# Patient Record
Sex: MALE | Race: WHITE | Employment: FULL TIME | URBAN - METROPOLITAN AREA
[De-identification: names, ages, dates, MRNs, and addresses within clinical notes are randomized per-mention and may not be internally consistent; named-entity substitution may affect disease eponyms.]

---

## 2020-01-09 ENCOUNTER — HOSPITAL ENCOUNTER (OUTPATIENT)
Dept: NON INVASIVE DIAGNOSTICS | Age: 60
Discharge: HOME | End: 2020-01-09
Payer: COMMERCIAL

## 2020-01-09 ENCOUNTER — HOSPITAL ENCOUNTER (OUTPATIENT)
Dept: NUCLEAR MEDICINE | Age: 60
Discharge: HOME | End: 2020-01-09
Payer: COMMERCIAL

## 2020-01-09 VITALS — WEIGHT: 170 LBS | BODY MASS INDEX: 26.68 KG/M2 | HEIGHT: 67 IN

## 2020-01-09 LAB
LV EF: 57 %
LVEF MODALITY: NORMAL

## 2020-01-09 PROCEDURE — 6360000002 HC RX W HCPCS: Performed by: GENERAL PRACTICE

## 2020-01-09 PROCEDURE — 93017 CV STRESS TEST TRACING ONLY: CPT

## 2020-01-09 PROCEDURE — A9500 TC99M SESTAMIBI: HCPCS | Performed by: RADIOLOGY

## 2020-01-09 PROCEDURE — 3430000000 HC RX DIAGNOSTIC RADIOPHARMACEUTICAL: Performed by: RADIOLOGY

## 2020-01-09 PROCEDURE — 93018 CV STRESS TEST I&R ONLY: CPT | Performed by: INTERNAL MEDICINE

## 2020-01-09 PROCEDURE — 93016 CV STRESS TEST SUPVJ ONLY: CPT | Performed by: INTERNAL MEDICINE

## 2020-01-09 PROCEDURE — 78452 HT MUSCLE IMAGE SPECT MULT: CPT

## 2020-01-09 RX ADMIN — Medication 30 MILLICURIE: at 09:59

## 2020-01-09 RX ADMIN — REGADENOSON 0.4 MG: 0.08 INJECTION, SOLUTION INTRAVENOUS at 10:40

## 2020-01-09 RX ADMIN — Medication 10 MILLICURIE: at 09:59

## 2021-09-08 ENCOUNTER — TELEPHONE (OUTPATIENT)
Dept: VASCULAR SURGERY | Age: 61
End: 2021-09-08

## 2021-09-09 ENCOUNTER — OFFICE VISIT (OUTPATIENT)
Dept: VASCULAR SURGERY | Age: 61
End: 2021-09-09
Payer: COMMERCIAL

## 2021-09-09 VITALS — BODY MASS INDEX: 25.9 KG/M2 | WEIGHT: 165 LBS | HEIGHT: 67 IN

## 2021-09-09 DIAGNOSIS — I73.9 PVD (PERIPHERAL VASCULAR DISEASE) WITH CLAUDICATION (HCC): ICD-10-CM

## 2021-09-09 DIAGNOSIS — R09.89 BILATERAL CAROTID BRUITS: ICD-10-CM

## 2021-09-09 DIAGNOSIS — I65.23 BILATERAL CAROTID ARTERY STENOSIS: ICD-10-CM

## 2021-09-09 DIAGNOSIS — F17.200 TOBACCO DEPENDENCE: ICD-10-CM

## 2021-09-09 PROCEDURE — 99204 OFFICE O/P NEW MOD 45 MIN: CPT | Performed by: SURGERY

## 2021-09-09 RX ORDER — METOPROLOL SUCCINATE 25 MG/1
25 TABLET, EXTENDED RELEASE ORAL DAILY
COMMUNITY
End: 2022-03-10 | Stop reason: CLARIF

## 2021-09-09 RX ORDER — CILOSTAZOL 100 MG/1
100 TABLET ORAL 2 TIMES DAILY
Qty: 60 TABLET | Refills: 11 | Status: SHIPPED | OUTPATIENT
Start: 2021-09-09 | End: 2022-03-10

## 2021-09-09 RX ORDER — ROSUVASTATIN CALCIUM 10 MG/1
10 TABLET, COATED ORAL DAILY
COMMUNITY
End: 2022-03-10 | Stop reason: CLARIF

## 2021-09-09 RX ORDER — AMLODIPINE BESYLATE 10 MG/1
10 TABLET ORAL DAILY
COMMUNITY

## 2021-09-09 RX ORDER — ASPIRIN 81 MG/1
81 TABLET ORAL DAILY
COMMUNITY

## 2021-09-09 NOTE — PROGRESS NOTES
Chief Complaint:   Chief Complaint   Patient presents with    Consultation     new pt. IVAN         HPI: Patient came to the office by himself, for the evaluation of abnormal carotid ultrasound that was done because of presence of carotid bruit, revealed significant left carotid stenosis and patient was referred by his PCP for further evaluation    Patient smokes about 1 and half to 2 packs/day    Patient underwent cardiac evaluation few years ago, was told, his cardiac condition was stable bilateral carotid bruit noted      Patient denies any focal lateralizing neurological symptoms like loss of speech, vision or loss of function of extremity    Patient can walk short distance only, 30 yards only, after that both hips hurt him and he has stopped walking for a few minutes and then start again, and denies any symptoms of rest pain    No Known Allergies    Current Outpatient Medications   Medication Sig Dispense Refill    amLODIPine (NORVASC) 5 MG tablet Take 5 mg by mouth daily      metoprolol succinate (TOPROL XL) 25 MG extended release tablet Take 25 mg by mouth daily      rosuvastatin (CRESTOR) 10 MG tablet Take 10 mg by mouth daily      aspirin 81 MG EC tablet Take 81 mg by mouth daily      cilostazol (PLETAL) 100 MG tablet Take 1 tablet by mouth 2 times daily 60 tablet 11     No current facility-administered medications for this visit. Past Medical History:   Diagnosis Date    Bilateral carotid artery stenosis 9/9/2021    Bilateral carotid bruits 9/9/2021    IVAN (cerebral atherosclerosis)     HTN (hypertension)     Hypercholesteremia     Tobacco dependence 9/9/2021       Past Surgical History:   Procedure Laterality Date    CATARACT REMOVAL         History reviewed. No pertinent family history.     Social History     Socioeconomic History    Marital status:      Spouse name: Not on file    Number of children: Not on file    Years of education: Not on file    Highest education level: Not on file   Occupational History    Not on file   Tobacco Use    Smoking status: Current Every Day Smoker    Smokeless tobacco: Never Used   Substance and Sexual Activity    Alcohol use: Not Currently    Drug use: Never    Sexual activity: Not on file   Other Topics Concern    Not on file   Social History Narrative    Not on file     Social Determinants of Health     Financial Resource Strain:     Difficulty of Paying Living Expenses:    Food Insecurity:     Worried About Running Out of Food in the Last Year:     [address] in the Last Year:    Transportation Needs:     Lack of Transportation (Medical):  Lack of Transportation (Non-Medical):    Physical Activity:     Days of Exercise per Week:     Minutes of Exercise per Session:    Stress:     Feeling of Stress :    Social Connections:     Frequency of Communication with Friends and Family:     Frequency of Social Gatherings with Friends and Family:     Attends [employer] Services:     Active Member of Clubs or Organizations:     Attends Club or Organization Meetings:     Marital Status:    Intimate Partner Violence:     Fear of Current or Ex-Partner:     Emotionally Abused:     Physically Abused:     Sexually Abused:        Review of Systems:  Skin:  No abnormal pigmentation or rash  Eyes:  No blurring, diplopia or vision loss  Ears/Nose/Throat:  No hearing loss or vertigo  Respiratory:  No cough, pleuritic chest pain, dyspnea, or wheezing. Tobacco use  Cardiovascular: No angina, palpitations . Hypertension, hyperlipidemia  Gastrointestinal:  No nausea or vomiting; no abdominal pain or rectal bleeding  Musculoskeletal:  No arthritis or weakness. Neurologic:  No paralysis, paresis, paresthesia, seizures or headaches  Hematologic/Lymphatic/Immunologic:  No anemia, abnormal bleeding/bruising, fever, chills or night sweats. Endocrine:  No heat or cold intolerance. No polyphagia, polydipsia or polyuria.       Physical Exam:  General appearance:  Alert, awake, oriented x 3. No distress. Skin:  Warm and dry    head:  Normocephalic. No masses, lesions or tenderness  Eyes:  Conjunctivae appear normal; PERRL  Ears:  External ears normal  Nose/Sinuses:  Septum midline, mucosa normal; no drainage  Oropharynx:  Clear, no exudate noted  Neck:  No jugular venous distention, lymphadenopathy or thyromegaly. Bilateral carotid bruit  Lungs:  Clear to ausculation bilaterally. No rhonchi, crackles, wheezes  Heart:  Regular rate and rhythm. No rub or murmur  Abdomen:  Soft, non-tender. No masses, organomegaly. Musculoskeletal : No joint effusions, tenderness swelling    Neuro: Speech is intact. Moving all extremities. No focal motor or sensory deficits      Extremities:  Both feet are warm to touch. The color of both feet is normal.        Pulses Right  Left    Brachial 3 3    Radial    3=normal   Femoral 1 1  2=diminished   Popliteal    1=barely palpable   Dorsalis pedis    0=absent   Posterior tibial 0 0  4=aneurysmal             Other pertinent information:1. The past medical records were reviewed. 2.  The carotid ultrasound report, from [facility], revealed greater than 70% stenosis on the left side and minimal disease on the right side    I have personally reviewed the carotid ultrasound, markedly increased velocities, on the left, peak systolic velocity greater than 982, diastolic velocity greater than 230 cm/s on the left side    3. The arterial Doppler scan that was done 2 years ago at the [facility] was reviewed, revealed evidence of peripheral vascular disease but no ankle-brachial index was done    Assessment:    1. PVD (peripheral vascular disease) with claudication ([address]    2. Tobacco dependence    3. Bilateral carotid bruits    4.  Bilateral carotid artery stenosis              Plan:       Discussed the patient, options, risks benefits and alternatives were explained I explained to the patient, that I have personally reviewed the, carotid ultrasound as well as the arterial duplex scan done at the [facility]    Patient does have significant peripheral vascular disease as as well as severe left carotid stenosis    Patient was counseled to stop smoking    Continue low-dose aspirin    Patient recommended lower extremity arterial Doppler study along with a CTA of the carotids and make additional recommendations    Patient also recommended trial of Pletal          Patient was instructed to continue walking program and to call if any worsening of symptoms and to call if any focal lateralizing neurological symptoms like loss of speech, vision or loss of function of extremity. All the questions were answered. Orders Placed This Encounter   Procedures    CTA NECK W CONTRAST    VL LOWER EXTREMITY ARTERIAL SEGMENTAL PRESSURES W PPG    Basic Metabolic Panel     Orders Placed This Encounter   Medications    cilostazol (PLETAL) 100 MG tablet     Sig: Take 1 tablet by mouth 2 times daily     Dispense:  60 tablet     Refill:  11           Indicated follow-up: Return if symptoms worsen or fail to improve.

## 2021-10-06 ENCOUNTER — HOSPITAL ENCOUNTER (OUTPATIENT)
Dept: INTERVENTIONAL RADIOLOGY/VASCULAR | Age: 61
Discharge: HOME | End: 2021-10-08
Payer: COMMERCIAL

## 2021-10-06 ENCOUNTER — HOSPITAL ENCOUNTER (OUTPATIENT)
Dept: CT IMAGING | Age: 61
Discharge: HOME | End: 2021-10-08
Payer: COMMERCIAL

## 2021-10-06 ENCOUNTER — HOSPITAL ENCOUNTER (OUTPATIENT)
Age: 61
Discharge: HOME | End: 2021-10-06
Payer: COMMERCIAL

## 2021-10-06 DIAGNOSIS — I65.23 BILATERAL CAROTID ARTERY STENOSIS: ICD-10-CM

## 2021-10-06 DIAGNOSIS — R09.89 BILATERAL CAROTID BRUITS: ICD-10-CM

## 2021-10-06 DIAGNOSIS — I73.9 PVD (PERIPHERAL VASCULAR DISEASE) WITH CLAUDICATION (HCC): ICD-10-CM

## 2021-10-06 DIAGNOSIS — F17.200 TOBACCO DEPENDENCE: ICD-10-CM

## 2021-10-06 LAB
ANION GAP SERPL CALCULATED.3IONS-SCNC: 12 MMOL/L (ref 7–16)
BUN BLDV-MCNC: 7 MG/DL (ref 6–23)
CALCIUM SERPL-MCNC: 9.3 MG/DL (ref 8.6–10.2)
CHLORIDE BLD-SCNC: 103 MMOL/L (ref 98–107)
CO2: 25 MMOL/L (ref 22–29)
CREAT SERPL-MCNC: 0.8 MG/DL (ref 0.7–1.2)
GFR AFRICAN AMERICAN: >60
GFR NON-AFRICAN AMERICAN: >60 ML/MIN/1.73
GLUCOSE BLD-MCNC: 109 MG/DL (ref 74–99)
POTASSIUM SERPL-SCNC: 4.1 MMOL/L (ref 3.5–5)
SODIUM BLD-SCNC: 140 MMOL/L (ref 132–146)

## 2021-10-06 PROCEDURE — 80048 BASIC METABOLIC PNL TOTAL CA: CPT

## 2021-10-06 PROCEDURE — 6360000004 HC RX CONTRAST MEDICATION: Performed by: RADIOLOGY

## 2021-10-06 PROCEDURE — 93923 UPR/LXTR ART STDY 3+ LVLS: CPT

## 2021-10-06 PROCEDURE — 36415 COLL VENOUS BLD VENIPUNCTURE: CPT

## 2021-10-06 PROCEDURE — 70498 CT ANGIOGRAPHY NECK: CPT

## 2021-10-06 RX ADMIN — IOPAMIDOL 75 ML: 755 INJECTION, SOLUTION INTRAVENOUS at 09:45

## 2021-10-22 ENCOUNTER — TELEPHONE (OUTPATIENT)
Dept: VASCULAR SURGERY | Age: 61
End: 2021-10-22

## 2021-10-22 PROBLEM — I65.23 INTRACRANIAL CAROTID STENOSIS, BILATERAL: Status: ACTIVE | Noted: 2021-10-22

## 2021-10-22 NOTE — TELEPHONE ENCOUNTER
I was able to get hold of the patient today to discuss the test results, the lower extremity arterial Doppler study and the CTA of the carotids    Patient said, he forgot to call to discuss the test results    Patient overall is doing well, cut on smoking from 2 packs to 1 pack a day    Patient is currently taking aspirin and Pletal    Overall the legs feel slightly better    Informed him, that the artery Doppler study revealed significant vascular disease with an ankle-brachial of 0.5 approximately for now follow conservatively with a walking program tobacco cessation and antiplatelet therapy with aspirin and Pletal call if any symptoms    Patient also has significant carotid artery disease especially on the left side, 80% plus, on the right side only 40%, with widely patent vertebral arteries left side being dominant    I have carefully reviewed the past work-up, had a stress test done last year, revealed evidence of abnormal finding with reperfusion, in the inferior wall the patient at that time told me he was recommended medical therapy    Patient was advised, to see complete cardiac evaluation prior to vascular intervention, he told me he never saw any cardiologist other than having a stress test done, will facilitate appointment with [facility] cardiology    In the interim call if any symptoms, explained    All his questions were answered

## 2021-10-25 ENCOUNTER — TELEPHONE (OUTPATIENT)
Dept: CARDIOLOGY CLINIC | Age: 61
End: 2021-10-25

## 2021-10-25 DIAGNOSIS — Z01.818 PRE-OP EVALUATION: ICD-10-CM

## 2021-10-25 DIAGNOSIS — I65.23 BILATERAL CAROTID ARTERY STENOSIS: Primary | ICD-10-CM

## 2021-10-25 NOTE — TELEPHONE ENCOUNTER
Patient Appointment Form:      PCP: Dr. Mary Stevenson   Referring: Dr. Gladis Granados    Has the Patient:    Seen a Cardiologist? no    Had a heart catheterization? no    Had heart surgery? no    Had a stress test or nuclear stress test? yes   date: 01/09/2020   facility name:  [facility]    Had an echocardiogram? no    Had a vascular ultrasound? yes   date: 10/06/2021   facility name:  [facility]    Had a 24/48 heart monitor or extended cardiac event monitor? no    Had recent blood work in the last 6 months? yes    date: 10/06/2021    ordering physician:  Gladis Granados    Had a pacemaker/ICD/ILR implant? no    Seen an Electrophysiologist? no        Will send records via: in Epic      Date & time of appointment:  Urgent Referral 10/27/2021 [address] [place] [address]

## 2021-10-27 ENCOUNTER — OFFICE VISIT (OUTPATIENT)
Dept: CARDIOLOGY CLINIC | Age: 61
End: 2021-10-27
Payer: COMMERCIAL

## 2021-10-27 VITALS
DIASTOLIC BLOOD PRESSURE: 76 MMHG | HEART RATE: 78 BPM | BODY MASS INDEX: 26.21 KG/M2 | SYSTOLIC BLOOD PRESSURE: 164 MMHG | WEIGHT: 167 LBS | RESPIRATION RATE: 18 BRPM | HEIGHT: 67 IN

## 2021-10-27 DIAGNOSIS — I65.23 BILATERAL CAROTID ARTERY STENOSIS: ICD-10-CM

## 2021-10-27 DIAGNOSIS — Z01.810 PREOP CARDIOVASCULAR EXAM: Primary | ICD-10-CM

## 2021-10-27 PROCEDURE — 99214 OFFICE O/P EST MOD 30 MIN: CPT | Performed by: INTERNAL MEDICINE

## 2021-10-27 PROCEDURE — 93000 ELECTROCARDIOGRAM COMPLETE: CPT | Performed by: INTERNAL MEDICINE

## 2021-12-03 ENCOUNTER — TELEPHONE (OUTPATIENT)
Dept: VASCULAR SURGERY | Age: 61
End: 2021-12-03

## 2021-12-03 NOTE — TELEPHONE ENCOUNTER
Discussed the patient, informed him, I received note from Dr. Guerrero Reap, earlier this week, okay to proceed with carotid surgery from a cardiac point    As currently I am not doing vascular surgery, informed him that I will make arrangements for the patient to see one of my partners in the office to facilitate the same    Patient recommended, to continue aspirin and Pletal for his peripheral vascular disease with claudication which he tells me has improved    Patient also trying to cut down smoking completely    Patient was instead call immediately if any focal neurological symptoms    All his questions were answered

## 2021-12-17 NOTE — PROGRESS NOTES
[address] [facility] PRE-ADMISSION TESTING GENERAL INSTRUCTIONS- [facility]phone number:[phone]    GENERAL INSTRUCTIONS  [x] Antibacterial Soap shower Night before and/or AM of Surgery  [x] Nothing by mouth after midnight, including gum, candy, mints, or water. [x] You may brush your teeth, gargle, but do NOT swallow water. [x]No smoking, chewing tobacco, illegal drugs, or alcohol within 24 hours of your surgery. [x] Jewelry, valuables or body piercing's should not be brought to the hospital. All body and/or tongue piercing's must be removed prior to arriving to hospital.  ALL hair pins must be removed. [x] Do not wear makeup, lotions, powders, deodorant. Nail polish as directed by the nurse. [x] Arrange transportation with a responsible adult  to and from the hospital. If you do not have a responsible adult  to transport you, you will need to make arrangements with a medical transportation company (i.e. [employer]. A Uber/taxi/bus is not appropriate unless you are accompanied by a responsible adult ). Arrange for someone to be with you for the remainder of the day and for 24 hours after your procedure due to having had anesthesia. Who will be your  for transportation? daughter  [x] Bring insurance card and photo ID. [x] Transfusion Bracelet: Please bring with you to hospital, day of surgery     PARKING INSTRUCTIONS:   [x] Arrival Time: Dec 28 th, arrive at 7:30 am, one person to accompany, please wear mask  · [x] Parking lot '\"I\"  is located on [facility] (the corner of [facility]). To enter, press the button and the gate will lift. A free token will be provided to exit the lot. One car per patient is allowed to park in this lot. All other cars are to park on [address] [place] [address] either in the parking garage or the handicap lot.            [] To reach the [facility] lobby from [address] [place] [address], upon entering the hospital, take elevator B to the 3rd floor. EDUCATION INSTRUCTIONS:    .     [x] [address] placed in chart. [x] Pre-admission Testing educational folder given  [x] Incentive Spirometry,coughing & deep breathing exercises reviewed. [x]Medication information sheet(s)   [x]Fluoroscopy-Xray used in surgery reviewed with patient. Educational pamphlet placed in chart. [x]Pain: Post-op pain is normal and to be expected. You will be asked to rate your pain from 0-10(a zero is not acceptable-education is needed). Your post-op pain goal is:  [x] Ask your nurse for your pain medication. MEDICATION INSTRUCTIONS:   [x]Bring a complete list of your medications, please write the last time you took the medicine, give this list to the nurse. [x] Take the following medications the morning of surgery with 1-2 ounces of water: metoprolol, norvasc  [x] Stop herbal supplements and vitamins 5 days before your surgery. [x] Follow physician instructions regarding any blood thinners you may be taking. WHAT TO EXPECT:  [x] The day of surgery you will be greeted and checked in by the [employer].  In addition, you will be registered in the [facility] by a Patient Access Representative. Please bring your photo ID and insurance card. A nurse will greet you in accordance to the time you are needed in the pre-op area to prepare you for surgery. Please do not be discouraged if you are not greeted in the order you arrive as there are many variables that are involved in patient preparation. Your patience is greatly appreciated as you wait for your nurse. Please bring in items such as: books, magazines, newspapers, electronics, or any other items  to occupy your time in the waiting area. [x]  Delays may occur with surgery and staff will make a sincere effort to keep you informed of delays.   If any delays occur with your procedure, we apologize ahead of time for your inconvenience as we recognize the value of your time. no

## 2021-12-22 ENCOUNTER — HOSPITAL ENCOUNTER (OUTPATIENT)
Dept: PREADMISSION TESTING | Age: 61
Discharge: HOME | End: 2021-12-22
Payer: COMMERCIAL

## 2021-12-22 ENCOUNTER — HOSPITAL ENCOUNTER (OUTPATIENT)
Dept: GENERAL RADIOLOGY | Age: 61
Discharge: HOME | End: 2021-12-24
Payer: COMMERCIAL

## 2021-12-22 ENCOUNTER — OFFICE VISIT (OUTPATIENT)
Dept: VASCULAR SURGERY | Age: 61
End: 2021-12-22
Payer: COMMERCIAL

## 2021-12-22 VITALS
WEIGHT: 166.3 LBS | SYSTOLIC BLOOD PRESSURE: 146 MMHG | DIASTOLIC BLOOD PRESSURE: 73 MMHG | BODY MASS INDEX: 26.1 KG/M2 | HEART RATE: 57 BPM | HEIGHT: 67 IN | TEMPERATURE: 98 F | RESPIRATION RATE: 16 BRPM | OXYGEN SATURATION: 98 %

## 2021-12-22 VITALS
WEIGHT: 167 LBS | BODY MASS INDEX: 26.21 KG/M2 | HEIGHT: 67 IN | SYSTOLIC BLOOD PRESSURE: 140 MMHG | DIASTOLIC BLOOD PRESSURE: 78 MMHG

## 2021-12-22 DIAGNOSIS — Z01.812 PRE-OPERATIVE LABORATORY EXAMINATION: Primary | ICD-10-CM

## 2021-12-22 DIAGNOSIS — Z01.810 PREOP CARDIOVASCULAR EXAM: ICD-10-CM

## 2021-12-22 DIAGNOSIS — R09.89 BILATERAL CAROTID BRUITS: Primary | ICD-10-CM

## 2021-12-22 DIAGNOSIS — I65.23 BILATERAL CAROTID ARTERY STENOSIS: ICD-10-CM

## 2021-12-22 LAB
ABO/RH: NORMAL
ANION GAP SERPL CALCULATED.3IONS-SCNC: 13 MMOL/L (ref 7–16)
ANTIBODY SCREEN: NORMAL
BUN BLDV-MCNC: 7 MG/DL (ref 6–23)
CALCIUM SERPL-MCNC: 9.6 MG/DL (ref 8.6–10.2)
CHLORIDE BLD-SCNC: 101 MMOL/L (ref 98–107)
CO2: 25 MMOL/L (ref 22–29)
CREAT SERPL-MCNC: 0.7 MG/DL (ref 0.7–1.2)
GFR AFRICAN AMERICAN: >60
GFR NON-AFRICAN AMERICAN: >60 ML/MIN/1.73
GLUCOSE BLD-MCNC: 111 MG/DL (ref 74–99)
HCT VFR BLD CALC: 43.8 % (ref 37–54)
HEMOGLOBIN: 15 G/DL (ref 12.5–16.5)
INR BLD: 1
MCH RBC QN AUTO: 30.3 PG (ref 26–35)
MCHC RBC AUTO-ENTMCNC: 34.2 % (ref 32–34.5)
MCV RBC AUTO: 88.5 FL (ref 80–99.9)
PDW BLD-RTO: 13.4 FL (ref 11.5–15)
PLATELET # BLD: 371 E9/L (ref 130–450)
PMV BLD AUTO: 9.3 FL (ref 7–12)
POTASSIUM REFLEX MAGNESIUM: 3.6 MMOL/L (ref 3.5–5)
PROTHROMBIN TIME: 11.3 SEC (ref 9.3–12.4)
RBC # BLD: 4.95 E12/L (ref 3.8–5.8)
SODIUM BLD-SCNC: 139 MMOL/L (ref 132–146)
WBC # BLD: 10 E9/L (ref 4.5–11.5)

## 2021-12-22 PROCEDURE — 85610 PROTHROMBIN TIME: CPT

## 2021-12-22 PROCEDURE — 99213 OFFICE O/P EST LOW 20 MIN: CPT | Performed by: SURGERY

## 2021-12-22 PROCEDURE — 85027 COMPLETE CBC AUTOMATED: CPT

## 2021-12-22 PROCEDURE — 36415 COLL VENOUS BLD VENIPUNCTURE: CPT

## 2021-12-22 PROCEDURE — 71046 X-RAY EXAM CHEST 2 VIEWS: CPT

## 2021-12-22 PROCEDURE — 86923 COMPATIBILITY TEST ELECTRIC: CPT

## 2021-12-22 PROCEDURE — 87081 CULTURE SCREEN ONLY: CPT

## 2021-12-22 PROCEDURE — 86900 BLOOD TYPING SEROLOGIC ABO: CPT

## 2021-12-22 PROCEDURE — 80048 BASIC METABOLIC PNL TOTAL CA: CPT

## 2021-12-22 PROCEDURE — 86850 RBC ANTIBODY SCREEN: CPT

## 2021-12-22 PROCEDURE — 86901 BLOOD TYPING SEROLOGIC RH(D): CPT

## 2021-12-22 NOTE — PROGRESS NOTES
Vascular Surgery Outpatient Progress Note      Chief Complaint   Patient presents with    Pre-op Exam     left CEA       HISTORY OF PRESENT ILLNESS:                The patient is a 59 y.o. male who returns for follow-up evaluation of high-grade left carotid artery disease. Has been seen and evaluated by my partner. Currently smoking approximately 1 pack of cigarettes per day. Initially dropped down but secondary to stress increase that again. He denies any right-sided left-sided weakness numbness or vision changes. He denies any chest pain shortness of breath or discomfort. He is on the schedule for a left carotid endarterectomy on the 28th. He is here to discuss the procedure. Past Medical History:        Diagnosis Date    Bilateral carotid artery stenosis 9/9/2021    Bilateral carotid bruits 9/9/2021    IVAN (cerebral atherosclerosis)     HTN (hypertension)     Hypercholesteremia     Intracranial carotid stenosis, bilateral 10/22/2021    Tobacco dependence 9/9/2021     Past Surgical History:        Procedure Laterality Date    CATARACT REMOVAL       Current Medications:   Prior to Admission medications    Medication Sig Start Date End Date Taking? Authorizing Provider   amLODIPine (NORVASC) 5 MG tablet Take 10 mg by mouth daily    Yes Historical Provider, MD   metoprolol succinate (TOPROL XL) 25 MG extended release tablet Take 25 mg by mouth daily   Yes Historical Provider, MD   rosuvastatin (CRESTOR) 10 MG tablet Take 10 mg by mouth daily   Yes Historical Provider, MD   aspirin 81 MG EC tablet Take 81 mg by mouth daily   Yes Historical Provider, MD   cilostazol (PLETAL) 100 MG tablet Take 1 tablet by mouth 2 times daily  Patient taking differently: Take 100 mg by mouth 2 times daily Hold the morning of surgery. 9/9/21 12/17/21  Surendra Anaya MD     Allergies:  Patient has no known allergies.     Social History     Socioeconomic History    Marital status:      Spouse name: Not on congestion, ear discharge, ear pain, hearing loss, nosebleeds, rhinorrhea, sinus pain, sore throat, tinnitus, trouble swallowing and voice change. Eyes: Negative for photophobia, pain, discharge, redness, itching and visual disturbance. Respiratory: Negative for apnea, cough, chest tightness, shortness of breath and wheezing. Cardiovascular: Negative for chest pain, palpitations and leg swelling. Gastrointestinal: Negative for abdominal distention, abdominal pain, blood in stool, constipation, diarrhea, nausea and vomiting. Endocrine: Negative for cold intolerance, heat intolerance, polydipsia, polyphagia and polyuria. Genitourinary: Negative for decreased urine volume, difficulty urinating, dysuria, frequency, hematuria and urgency. Musculoskeletal: Negative for arthralgias, back pain, gait problem, joint swelling and neck pain. Skin: Negative for color change, pallor, rash and wound. Allergic/Immunologic: Negative for environmental allergies, food allergies and immunocompromised state. Neurological: Negative for dizziness, syncope, facial asymmetry, speech difficulty, weakness, light-headedness, numbness and headaches. Hematological: Negative for adenopathy. Does not bruise/bleed easily. Psychiatric/Behavioral: Negative for agitation, confusion, sleep disturbance and suicidal ideas. The patient is not nervous/anxious.             PHYSICAL EXAM:  Vitals:    12/22/21 0958   BP: (!) 140/78     General Appearance: alert and oriented to person, place and time, well developed and well- nourished, in no acute distress  Skin: warm and dry, no rash or erythema  Head: normocephalic and atraumatic  Eyes: extraocular eye movements intact, conjunctivae normal  ENT: external ear and ear canal normal bilaterally, nose without deformity  Pulmonary/Chest: clear to auscultation bilaterally- no wheezes, rales or rhonchi, normal air movement, no respiratory distress  Cardiovascular: normal rate, regular rhythm, normal S1 and S2, no murmurs, no carotid bruits  Abdomen: soft, non-tender, non-distended, normal bowel sounds, no masses or organomegaly  Musculoskeletal: normal range of motion, no joint swelling, deformity or tenderness  Neurologic: no cranial nerve deficit, gait, coordination and speech normal  Extremities: Bilateral palpable brachial and radial pulses. Problem List Items Addressed This Visit     None          #1 high-grade left carotid artery disease. His velocities are elevated in the 500 range. He has a high-grade left carotid stenosis. This is a couple centimeters above the bifurcation. The rest of the vessel is unremarkable. The right side demonstrates mild atherosclerotic disease. He has mild subclavian disease but has nice palpable pulses. He has intracranial right carotid disease at the internal carotid cavernous portion. He has mild atherosclerotic disease of the right internal carotid and a high-grade left internal carotid stenosis. At this point were planning a left carotid endarterectomy. Have gone over risk benefits and alternatives with him    Risk benefits alternatives include bleeding, infection, arteriovenous nerve injury, myocardial infarction, respiratory failure, anesthetic reaction, pain swelling or tenderness, swelling, stroke, and/or death she understands wishes to proceed        No follow-ups on file.

## 2021-12-23 LAB — MRSA CULTURE ONLY: NORMAL

## 2021-12-27 ENCOUNTER — ANESTHESIA EVENT (OUTPATIENT)
Dept: OPERATING ROOM | Age: 61
DRG: 039 | End: 2021-12-27
Payer: COMMERCIAL

## 2021-12-27 NOTE — PROGRESS NOTES
Pt notified of surgery time change for tomorrow. Pt now instructed to arrive at 0530. Surgery planned for 0730.    Aminata Knutson RN

## 2021-12-28 ENCOUNTER — HOSPITAL ENCOUNTER (INPATIENT)
Age: 61
LOS: 1 days | Discharge: LEFT BEFORE DISCHARGE | DRG: 039 | End: 2021-12-29
Attending: SURGERY | Admitting: SURGERY
Payer: COMMERCIAL

## 2021-12-28 ENCOUNTER — ANESTHESIA (OUTPATIENT)
Dept: OPERATING ROOM | Age: 61
DRG: 039 | End: 2021-12-28
Payer: COMMERCIAL

## 2021-12-28 VITALS — SYSTOLIC BLOOD PRESSURE: 156 MMHG | OXYGEN SATURATION: 96 % | DIASTOLIC BLOOD PRESSURE: 85 MMHG

## 2021-12-28 DIAGNOSIS — I65.23 BILATERAL CAROTID ARTERY STENOSIS: ICD-10-CM

## 2021-12-28 DIAGNOSIS — Z01.810 PREOP CARDIOVASCULAR EXAM: Primary | ICD-10-CM

## 2021-12-28 PROBLEM — I65.22 CAROTID STENOSIS, LEFT: Status: ACTIVE | Noted: 2021-12-28

## 2021-12-28 PROCEDURE — 85347 COAGULATION TIME ACTIVATED: CPT

## 2021-12-28 PROCEDURE — 2500000003 HC RX 250 WO HCPCS: Performed by: SURGERY

## 2021-12-28 PROCEDURE — 6370000000 HC RX 637 (ALT 250 FOR IP): Performed by: SURGERY

## 2021-12-28 PROCEDURE — 2580000003 HC RX 258: Performed by: NURSE PRACTITIONER

## 2021-12-28 PROCEDURE — 2580000003 HC RX 258

## 2021-12-28 PROCEDURE — 6360000002 HC RX W HCPCS

## 2021-12-28 PROCEDURE — 88311 DECALCIFY TISSUE: CPT

## 2021-12-28 PROCEDURE — 6360000002 HC RX W HCPCS: Performed by: NURSE PRACTITIONER

## 2021-12-28 PROCEDURE — 35301 RECHANNELING OF ARTERY: CPT | Performed by: SURGERY

## 2021-12-28 PROCEDURE — 2709999900 HC NON-CHARGEABLE SUPPLY: Performed by: SURGERY

## 2021-12-28 PROCEDURE — 2500000003 HC RX 250 WO HCPCS: Performed by: NURSE ANESTHETIST, CERTIFIED REGISTERED

## 2021-12-28 PROCEDURE — 88304 TISSUE EXAM BY PATHOLOGIST: CPT

## 2021-12-28 PROCEDURE — 03UL0KZ SUPPLEMENT LEFT INTERNAL CAROTID ARTERY WITH NONAUTOLOGOUS TISSUE SUBSTITUTE, OPEN APPROACH: ICD-10-PCS | Performed by: SURGERY

## 2021-12-28 PROCEDURE — 03CL0ZZ EXTIRPATION OF MATTER FROM LEFT INTERNAL CAROTID ARTERY, OPEN APPROACH: ICD-10-PCS | Performed by: SURGERY

## 2021-12-28 PROCEDURE — 3700000001 HC ADD 15 MINUTES (ANESTHESIA): Performed by: SURGERY

## 2021-12-28 PROCEDURE — 2500000003 HC RX 250 WO HCPCS

## 2021-12-28 PROCEDURE — 3700000000 HC ANESTHESIA ATTENDED CARE: Performed by: SURGERY

## 2021-12-28 PROCEDURE — 37799 UNLISTED PX VASCULAR SURGERY: CPT

## 2021-12-28 PROCEDURE — 2580000003 HC RX 258: Performed by: SURGERY

## 2021-12-28 PROCEDURE — 7100000000 HC PACU RECOVERY - FIRST 15 MIN: Performed by: SURGERY

## 2021-12-28 PROCEDURE — 7100000001 HC PACU RECOVERY - ADDTL 15 MIN: Performed by: SURGERY

## 2021-12-28 PROCEDURE — 2000000000 HC ICU R&B

## 2021-12-28 PROCEDURE — 6360000002 HC RX W HCPCS: Performed by: NURSE ANESTHETIST, CERTIFIED REGISTERED

## 2021-12-28 PROCEDURE — 3600000005 HC SURGERY LEVEL 5 BASE: Performed by: SURGERY

## 2021-12-28 PROCEDURE — 3600000015 HC SURGERY LEVEL 5 ADDTL 15MIN: Performed by: SURGERY

## 2021-12-28 PROCEDURE — C1768 GRAFT, VASCULAR: HCPCS | Performed by: SURGERY

## 2021-12-28 PROCEDURE — 6360000002 HC RX W HCPCS: Performed by: SURGERY

## 2021-12-28 DEVICE — XENOSURE BIOLOGIC PATCH, 0.8CM X 8CM, EIFU
Type: IMPLANTABLE DEVICE | Site: CAROTID | Status: FUNCTIONAL
Brand: XENOSURE BIOLOGIC PATCH

## 2021-12-28 RX ORDER — OXYCODONE HYDROCHLORIDE 5 MG/1
5 TABLET ORAL EVERY 4 HOURS PRN
Status: DISCONTINUED | OUTPATIENT
Start: 2021-12-28 | End: 2021-12-29 | Stop reason: HOSPADM

## 2021-12-28 RX ORDER — CILOSTAZOL 100 MG/1
100 TABLET ORAL 2 TIMES DAILY
Status: DISCONTINUED | OUTPATIENT
Start: 2021-12-29 | End: 2021-12-29 | Stop reason: HOSPADM

## 2021-12-28 RX ORDER — NEOSTIGMINE METHYLSULFATE 1 MG/ML
INJECTION, SOLUTION INTRAVENOUS PRN
Status: DISCONTINUED | OUTPATIENT
Start: 2021-12-28 | End: 2021-12-28 | Stop reason: SDUPTHER

## 2021-12-28 RX ORDER — SODIUM CHLORIDE 0.9 % (FLUSH) 0.9 %
5-40 SYRINGE (ML) INJECTION EVERY 12 HOURS SCHEDULED
Status: DISCONTINUED | OUTPATIENT
Start: 2021-12-28 | End: 2021-12-29 | Stop reason: HOSPADM

## 2021-12-28 RX ORDER — LABETALOL HYDROCHLORIDE 5 MG/ML
10 INJECTION, SOLUTION INTRAVENOUS
Status: DISCONTINUED | OUTPATIENT
Start: 2021-12-28 | End: 2021-12-29 | Stop reason: HOSPADM

## 2021-12-28 RX ORDER — LIDOCAINE HYDROCHLORIDE 20 MG/ML
INJECTION, SOLUTION INTRAVENOUS PRN
Status: DISCONTINUED | OUTPATIENT
Start: 2021-12-28 | End: 2021-12-28 | Stop reason: SDUPTHER

## 2021-12-28 RX ORDER — HYDRALAZINE HYDROCHLORIDE 20 MG/ML
10 INJECTION INTRAMUSCULAR; INTRAVENOUS
Status: DISCONTINUED | OUTPATIENT
Start: 2021-12-28 | End: 2021-12-29 | Stop reason: HOSPADM

## 2021-12-28 RX ORDER — HEPARIN SODIUM 1000 [USP'U]/ML
INJECTION, SOLUTION INTRAVENOUS; SUBCUTANEOUS PRN
Status: DISCONTINUED | OUTPATIENT
Start: 2021-12-28 | End: 2021-12-28 | Stop reason: SDUPTHER

## 2021-12-28 RX ORDER — SODIUM CHLORIDE 9 MG/ML
INJECTION, SOLUTION INTRAVENOUS CONTINUOUS PRN
Status: DISCONTINUED | OUTPATIENT
Start: 2021-12-28 | End: 2021-12-28 | Stop reason: SDUPTHER

## 2021-12-28 RX ORDER — ACETAMINOPHEN 325 MG/1
650 TABLET ORAL
Status: DISCONTINUED | OUTPATIENT
Start: 2021-12-28 | End: 2021-12-29 | Stop reason: HOSPADM

## 2021-12-28 RX ORDER — METOPROLOL SUCCINATE 25 MG/1
25 TABLET, EXTENDED RELEASE ORAL DAILY
Status: DISCONTINUED | OUTPATIENT
Start: 2021-12-29 | End: 2021-12-29 | Stop reason: HOSPADM

## 2021-12-28 RX ORDER — GLYCOPYRROLATE 1 MG/5 ML
SYRINGE (ML) INTRAVENOUS PRN
Status: DISCONTINUED | OUTPATIENT
Start: 2021-12-28 | End: 2021-12-28 | Stop reason: SDUPTHER

## 2021-12-28 RX ORDER — CEFAZOLIN SODIUM 1 G/3ML
INJECTION, POWDER, FOR SOLUTION INTRAMUSCULAR; INTRAVENOUS PRN
Status: DISCONTINUED | OUTPATIENT
Start: 2021-12-28 | End: 2021-12-28 | Stop reason: SDUPTHER

## 2021-12-28 RX ORDER — SODIUM CHLORIDE 450 MG/100ML
INJECTION, SOLUTION INTRAVENOUS CONTINUOUS
Status: DISCONTINUED | OUTPATIENT
Start: 2021-12-28 | End: 2021-12-28

## 2021-12-28 RX ORDER — SODIUM CHLORIDE 9 MG/ML
INJECTION, SOLUTION INTRAVENOUS CONTINUOUS
Status: DISCONTINUED | OUTPATIENT
Start: 2021-12-28 | End: 2021-12-29

## 2021-12-28 RX ORDER — CALCIUM CHLORIDE 100 MG/ML
INJECTION INTRAVENOUS; INTRAVENTRICULAR PRN
Status: DISCONTINUED | OUTPATIENT
Start: 2021-12-28 | End: 2021-12-28 | Stop reason: SDUPTHER

## 2021-12-28 RX ORDER — PROTAMINE SULFATE 10 MG/ML
INJECTION, SOLUTION INTRAVENOUS PRN
Status: DISCONTINUED | OUTPATIENT
Start: 2021-12-28 | End: 2021-12-28 | Stop reason: SDUPTHER

## 2021-12-28 RX ORDER — LABETALOL HYDROCHLORIDE 5 MG/ML
INJECTION, SOLUTION INTRAVENOUS PRN
Status: DISCONTINUED | OUTPATIENT
Start: 2021-12-28 | End: 2021-12-28 | Stop reason: SDUPTHER

## 2021-12-28 RX ORDER — SODIUM CHLORIDE 9 MG/ML
25 INJECTION, SOLUTION INTRAVENOUS PRN
Status: DISCONTINUED | OUTPATIENT
Start: 2021-12-28 | End: 2021-12-29 | Stop reason: HOSPADM

## 2021-12-28 RX ORDER — ONDANSETRON 4 MG/1
4 TABLET, ORALLY DISINTEGRATING ORAL EVERY 8 HOURS PRN
Status: DISCONTINUED | OUTPATIENT
Start: 2021-12-28 | End: 2021-12-29 | Stop reason: HOSPADM

## 2021-12-28 RX ORDER — ASPIRIN 81 MG/1
81 TABLET ORAL DAILY
Status: DISCONTINUED | OUTPATIENT
Start: 2021-12-29 | End: 2021-12-29 | Stop reason: HOSPADM

## 2021-12-28 RX ORDER — PROPOFOL 10 MG/ML
INJECTION, EMULSION INTRAVENOUS PRN
Status: DISCONTINUED | OUTPATIENT
Start: 2021-12-28 | End: 2021-12-28 | Stop reason: SDUPTHER

## 2021-12-28 RX ORDER — ASPIRIN 81 MG/1
81 TABLET ORAL DAILY
Status: DISCONTINUED | OUTPATIENT
Start: 2021-12-28 | End: 2021-12-28 | Stop reason: SDUPTHER

## 2021-12-28 RX ORDER — SODIUM CHLORIDE 0.9 % (FLUSH) 0.9 %
10 SYRINGE (ML) INJECTION EVERY 12 HOURS SCHEDULED
Status: DISCONTINUED | OUTPATIENT
Start: 2021-12-28 | End: 2021-12-28 | Stop reason: HOSPADM

## 2021-12-28 RX ORDER — ROCURONIUM BROMIDE 10 MG/ML
INJECTION, SOLUTION INTRAVENOUS PRN
Status: DISCONTINUED | OUTPATIENT
Start: 2021-12-28 | End: 2021-12-28 | Stop reason: SDUPTHER

## 2021-12-28 RX ORDER — SODIUM CHLORIDE 0.9 % (FLUSH) 0.9 %
10 SYRINGE (ML) INJECTION PRN
Status: DISCONTINUED | OUTPATIENT
Start: 2021-12-28 | End: 2021-12-28 | Stop reason: HOSPADM

## 2021-12-28 RX ORDER — ONDANSETRON 2 MG/ML
INJECTION INTRAMUSCULAR; INTRAVENOUS PRN
Status: DISCONTINUED | OUTPATIENT
Start: 2021-12-28 | End: 2021-12-28 | Stop reason: SDUPTHER

## 2021-12-28 RX ORDER — SODIUM CHLORIDE 9 MG/ML
25 INJECTION, SOLUTION INTRAVENOUS PRN
Status: DISCONTINUED | OUTPATIENT
Start: 2021-12-28 | End: 2021-12-28 | Stop reason: HOSPADM

## 2021-12-28 RX ORDER — ONDANSETRON 2 MG/ML
4 INJECTION INTRAMUSCULAR; INTRAVENOUS EVERY 6 HOURS PRN
Status: DISCONTINUED | OUTPATIENT
Start: 2021-12-28 | End: 2021-12-29 | Stop reason: HOSPADM

## 2021-12-28 RX ORDER — DEXAMETHASONE SODIUM PHOSPHATE 10 MG/ML
INJECTION INTRAMUSCULAR; INTRAVENOUS PRN
Status: DISCONTINUED | OUTPATIENT
Start: 2021-12-28 | End: 2021-12-28 | Stop reason: SDUPTHER

## 2021-12-28 RX ORDER — MIDAZOLAM HYDROCHLORIDE 1 MG/ML
INJECTION INTRAMUSCULAR; INTRAVENOUS PRN
Status: DISCONTINUED | OUTPATIENT
Start: 2021-12-28 | End: 2021-12-28 | Stop reason: SDUPTHER

## 2021-12-28 RX ORDER — FENTANYL CITRATE 50 UG/ML
INJECTION, SOLUTION INTRAMUSCULAR; INTRAVENOUS PRN
Status: DISCONTINUED | OUTPATIENT
Start: 2021-12-28 | End: 2021-12-28 | Stop reason: SDUPTHER

## 2021-12-28 RX ORDER — OXYCODONE HYDROCHLORIDE 10 MG/1
10 TABLET ORAL EVERY 4 HOURS PRN
Status: DISCONTINUED | OUTPATIENT
Start: 2021-12-28 | End: 2021-12-29 | Stop reason: HOSPADM

## 2021-12-28 RX ORDER — NITROGLYCERIN 5 MG/ML
INJECTION, SOLUTION INTRAVENOUS PRN
Status: DISCONTINUED | OUTPATIENT
Start: 2021-12-28 | End: 2021-12-28 | Stop reason: SDUPTHER

## 2021-12-28 RX ORDER — ROSUVASTATIN CALCIUM 20 MG/1
10 TABLET, COATED ORAL DAILY
Status: DISCONTINUED | OUTPATIENT
Start: 2021-12-29 | End: 2021-12-29

## 2021-12-28 RX ORDER — SODIUM CHLORIDE 0.9 % (FLUSH) 0.9 %
5-40 SYRINGE (ML) INJECTION PRN
Status: DISCONTINUED | OUTPATIENT
Start: 2021-12-28 | End: 2021-12-29 | Stop reason: HOSPADM

## 2021-12-28 RX ORDER — LIDOCAINE HYDROCHLORIDE 10 MG/ML
INJECTION, SOLUTION EPIDURAL; INFILTRATION; INTRACAUDAL; PERINEURAL PRN
Status: DISCONTINUED | OUTPATIENT
Start: 2021-12-28 | End: 2021-12-28 | Stop reason: SDUPTHER

## 2021-12-28 RX ORDER — AMLODIPINE BESYLATE 10 MG/1
10 TABLET ORAL DAILY
Status: DISCONTINUED | OUTPATIENT
Start: 2021-12-29 | End: 2021-12-29 | Stop reason: HOSPADM

## 2021-12-28 RX ADMIN — SODIUM CHLORIDE, PRESERVATIVE FREE 10 ML: 5 INJECTION INTRAVENOUS at 21:16

## 2021-12-28 RX ADMIN — ONDANSETRON 4 MG: 2 INJECTION INTRAMUSCULAR; INTRAVENOUS at 11:15

## 2021-12-28 RX ADMIN — NITROGLYCERIN 50 MCG: 5 INJECTION, SOLUTION INTRAVENOUS at 13:31

## 2021-12-28 RX ADMIN — LABETALOL HYDROCHLORIDE 5 MG: 5 INJECTION INTRAVENOUS at 13:13

## 2021-12-28 RX ADMIN — OXYCODONE HYDROCHLORIDE 10 MG: 10 TABLET ORAL at 21:16

## 2021-12-28 RX ADMIN — LABETALOL HYDROCHLORIDE 5 MG: 5 INJECTION INTRAVENOUS at 13:29

## 2021-12-28 RX ADMIN — CALCIUM CHLORIDE 1 G: 100 INJECTION INTRAVENOUS; INTRAVENTRICULAR at 12:49

## 2021-12-28 RX ADMIN — SODIUM CHLORIDE 25 ML: 9 INJECTION, SOLUTION INTRAVENOUS at 05:59

## 2021-12-28 RX ADMIN — LABETALOL HYDROCHLORIDE 2.5 MG: 5 INJECTION INTRAVENOUS at 13:06

## 2021-12-28 RX ADMIN — LABETALOL HYDROCHLORIDE 5 MG: 5 INJECTION INTRAVENOUS at 13:25

## 2021-12-28 RX ADMIN — SODIUM CHLORIDE: 9 INJECTION, SOLUTION INTRAVENOUS at 07:15

## 2021-12-28 RX ADMIN — Medication 0.6 MG: at 13:06

## 2021-12-28 RX ADMIN — Medication 3 MG: at 13:06

## 2021-12-28 RX ADMIN — OXYCODONE HYDROCHLORIDE 10 MG: 10 TABLET ORAL at 16:28

## 2021-12-28 RX ADMIN — SODIUM CHLORIDE: 9 INJECTION, SOLUTION INTRAVENOUS at 15:04

## 2021-12-28 RX ADMIN — CEFAZOLIN 2000 MG: 1 INJECTION, POWDER, FOR SOLUTION INTRAMUSCULAR; INTRAVENOUS at 11:28

## 2021-12-28 RX ADMIN — LABETALOL HYDROCHLORIDE 5 MG: 5 INJECTION INTRAVENOUS at 13:31

## 2021-12-28 RX ADMIN — DEXAMETHASONE SODIUM PHOSPHATE 10 MG: 10 INJECTION INTRAMUSCULAR; INTRAVENOUS at 11:15

## 2021-12-28 RX ADMIN — PROTAMINE SULFATE 40 MG: 10 INJECTION, SOLUTION INTRAVENOUS at 12:49

## 2021-12-28 RX ADMIN — ROCURONIUM BROMIDE 50 MG: 10 SOLUTION INTRAVENOUS at 11:10

## 2021-12-28 RX ADMIN — PROPOFOL 150 MG: 10 INJECTION, EMULSION INTRAVENOUS at 11:10

## 2021-12-28 RX ADMIN — FENTANYL CITRATE 50 MCG: 50 INJECTION, SOLUTION INTRAMUSCULAR; INTRAVENOUS at 11:33

## 2021-12-28 RX ADMIN — Medication 2000 MG: at 21:25

## 2021-12-28 RX ADMIN — NITROGLYCERIN 50 MCG: 5 INJECTION, SOLUTION INTRAVENOUS at 13:16

## 2021-12-28 RX ADMIN — LABETALOL HYDROCHLORIDE 5 MG: 5 INJECTION INTRAVENOUS at 13:21

## 2021-12-28 RX ADMIN — MIDAZOLAM 1 MG: 1 INJECTION INTRAMUSCULAR; INTRAVENOUS at 13:23

## 2021-12-28 RX ADMIN — LABETALOL HYDROCHLORIDE 2.5 MG: 5 INJECTION INTRAVENOUS at 13:10

## 2021-12-28 RX ADMIN — MIDAZOLAM 2 MG: 1 INJECTION INTRAMUSCULAR; INTRAVENOUS at 11:02

## 2021-12-28 RX ADMIN — ACETAMINOPHEN 650 MG: 325 TABLET ORAL at 14:34

## 2021-12-28 RX ADMIN — ACETAMINOPHEN 650 MG: 325 TABLET ORAL at 21:30

## 2021-12-28 RX ADMIN — FENTANYL CITRATE 150 MCG: 50 INJECTION, SOLUTION INTRAMUSCULAR; INTRAVENOUS at 11:10

## 2021-12-28 RX ADMIN — ACETAMINOPHEN 650 MG: 325 TABLET ORAL at 18:09

## 2021-12-28 RX ADMIN — NITROGLYCERIN 50 MCG: 5 INJECTION, SOLUTION INTRAVENOUS at 13:23

## 2021-12-28 RX ADMIN — NITROGLYCERIN 50 MCG: 5 INJECTION, SOLUTION INTRAVENOUS at 12:57

## 2021-12-28 RX ADMIN — LIDOCAINE HYDROCHLORIDE 100 MG: 20 INJECTION, SOLUTION INTRAVENOUS at 11:10

## 2021-12-28 RX ADMIN — LIDOCAINE HYDROCHLORIDE 0.3 ML: 10 INJECTION, SOLUTION EPIDURAL; INFILTRATION; INTRACAUDAL; PERINEURAL at 07:15

## 2021-12-28 RX ADMIN — SODIUM CHLORIDE: 9 INJECTION, SOLUTION INTRAVENOUS at 11:02

## 2021-12-28 RX ADMIN — SODIUM CHLORIDE: 9 INJECTION, SOLUTION INTRAVENOUS at 12:52

## 2021-12-28 RX ADMIN — FENTANYL CITRATE 50 MCG: 50 INJECTION, SOLUTION INTRAMUSCULAR; INTRAVENOUS at 11:59

## 2021-12-28 RX ADMIN — HEPARIN SODIUM 10000 UNITS: 1000 INJECTION INTRAVENOUS; SUBCUTANEOUS at 11:47

## 2021-12-28 RX ADMIN — MIDAZOLAM 2 MG: 1 INJECTION INTRAMUSCULAR; INTRAVENOUS at 07:15

## 2021-12-28 ASSESSMENT — PULMONARY FUNCTION TESTS
PIF_VALUE: 18
PIF_VALUE: 0
PIF_VALUE: 0
PIF_VALUE: 2
PIF_VALUE: 13
PIF_VALUE: 17
PIF_VALUE: 17
PIF_VALUE: 18
PIF_VALUE: 18
PIF_VALUE: 16
PIF_VALUE: 17
PIF_VALUE: 20
PIF_VALUE: 17
PIF_VALUE: 14
PIF_VALUE: 18
PIF_VALUE: 2
PIF_VALUE: 2
PIF_VALUE: 18
PIF_VALUE: 17
PIF_VALUE: 18
PIF_VALUE: 14
PIF_VALUE: 0
PIF_VALUE: 0
PIF_VALUE: 18
PIF_VALUE: 6
PIF_VALUE: 31
PIF_VALUE: 17
PIF_VALUE: 18
PIF_VALUE: 18
PIF_VALUE: 17
PIF_VALUE: 18
PIF_VALUE: 13
PIF_VALUE: 2
PIF_VALUE: 14
PIF_VALUE: 18
PIF_VALUE: 2
PIF_VALUE: 19
PIF_VALUE: 14
PIF_VALUE: 18
PIF_VALUE: 0
PIF_VALUE: 18
PIF_VALUE: 2
PIF_VALUE: 1
PIF_VALUE: 13
PIF_VALUE: 14
PIF_VALUE: 18
PIF_VALUE: 1
PIF_VALUE: 18
PIF_VALUE: 5
PIF_VALUE: 18
PIF_VALUE: 13
PIF_VALUE: 14
PIF_VALUE: 15
PIF_VALUE: 19
PIF_VALUE: 13
PIF_VALUE: 17
PIF_VALUE: 18
PIF_VALUE: 18
PIF_VALUE: 13
PIF_VALUE: 1
PIF_VALUE: 18
PIF_VALUE: 19
PIF_VALUE: 19
PIF_VALUE: 4
PIF_VALUE: 18
PIF_VALUE: 1
PIF_VALUE: 18
PIF_VALUE: 18
PIF_VALUE: 0
PIF_VALUE: 18
PIF_VALUE: 18
PIF_VALUE: 15
PIF_VALUE: 1
PIF_VALUE: 18
PIF_VALUE: 1
PIF_VALUE: 25
PIF_VALUE: 1
PIF_VALUE: 18
PIF_VALUE: 18
PIF_VALUE: 3
PIF_VALUE: 18
PIF_VALUE: 18
PIF_VALUE: 2
PIF_VALUE: 18
PIF_VALUE: 15
PIF_VALUE: 18
PIF_VALUE: 19
PIF_VALUE: 18
PIF_VALUE: 15
PIF_VALUE: 14
PIF_VALUE: 17
PIF_VALUE: 19
PIF_VALUE: 18
PIF_VALUE: 14
PIF_VALUE: 18
PIF_VALUE: 18
PIF_VALUE: 17
PIF_VALUE: 17
PIF_VALUE: 18
PIF_VALUE: 13
PIF_VALUE: 18
PIF_VALUE: 18
PIF_VALUE: 2
PIF_VALUE: 18
PIF_VALUE: 18
PIF_VALUE: 14
PIF_VALUE: 18
PIF_VALUE: 15
PIF_VALUE: 18
PIF_VALUE: 19
PIF_VALUE: 17
PIF_VALUE: 18
PIF_VALUE: 18
PIF_VALUE: 19
PIF_VALUE: 18

## 2021-12-28 ASSESSMENT — PAIN SCALES - GENERAL
PAINLEVEL_OUTOF10: 7
PAINLEVEL_OUTOF10: 5
PAINLEVEL_OUTOF10: 6
PAINLEVEL_OUTOF10: 7
PAINLEVEL_OUTOF10: 5
PAINLEVEL_OUTOF10: 7
PAINLEVEL_OUTOF10: 7
PAINLEVEL_OUTOF10: 5
PAINLEVEL_OUTOF10: 7
PAINLEVEL_OUTOF10: 5
PAINLEVEL_OUTOF10: 7

## 2021-12-28 ASSESSMENT — PAIN DESCRIPTION - LOCATION
LOCATION: NECK;INCISION
LOCATION: INCISION;NECK
LOCATION: NECK
LOCATION: NECK;INCISION
LOCATION: NECK

## 2021-12-28 ASSESSMENT — PAIN DESCRIPTION - PAIN TYPE
TYPE: SURGICAL PAIN

## 2021-12-28 ASSESSMENT — LIFESTYLE VARIABLES: SMOKING_STATUS: 1

## 2021-12-28 ASSESSMENT — PAIN - FUNCTIONAL ASSESSMENT: PAIN_FUNCTIONAL_ASSESSMENT: 0-10

## 2021-12-28 NOTE — PLAN OF CARE
Problem: Discharge Planning:  Goal: Participates in care planning  Description: Participates in care planning  12/28/2021 1841 by Cindy John RN  Outcome: Ongoing  12/28/2021 1824 by Patel Koo RN  Outcome: Met This Shift  12/28/2021 1758 by Cindy John RN  Outcome: Ongoing  Goal: Discharged to appropriate level of care  Description: Discharged to appropriate level of care  12/28/2021 1841 by Cindy John RN  Outcome: Ongoing  12/28/2021 1824 by Patel Koo RN  Outcome: Met This Shift  12/28/2021 1758 by Cindy John RN  Outcome: Ongoing     Problem: Airway Clearance - Ineffective:  Goal: Ability to maintain a clear airway will improve  Description: Ability to maintain a clear airway will improve  12/28/2021 1841 by Cindy John RN  Outcome: Ongoing  12/28/2021 1824 by Patel Koo RN  Outcome: Met This Shift  12/28/2021 1758 by Cindy John RN  Outcome: Ongoing     Problem: Anxiety/Stress:  Goal: Level of anxiety will decrease  Description: Level of anxiety will decrease  12/28/2021 1841 by Cindy John RN  Outcome: Ongoing  12/28/2021 1824 by Patel Koo RN  Outcome: Met This Shift  12/28/2021 1758 by Cindy John RN  Outcome: Ongoing     Problem: Aspiration:  Goal: Absence of aspiration  Description: Absence of aspiration  12/28/2021 1841 by Cindy John RN  Outcome: Ongoing  12/28/2021 1824 by Patel Koo RN  Outcome: Met This Shift  12/28/2021 1758 by Cindy John RN  Outcome: Ongoing     Problem:  Bowel Function - Altered:  Goal: Bowel elimination is within specified parameters  Description: Bowel elimination is within specified parameters  12/28/2021 1841 by Cindy John RN  Outcome: Ongoing  12/28/2021 1824 by Patel Koo RN  Outcome: Met This Shift  12/28/2021 1758 by Cindy John RN  Outcome: Ongoing     Problem: Cardiac Output - Decreased:  Goal: Hemodynamic stability will improve  Description: Hemodynamic stability will improve  12/28/2021 1841 by Maurice Gonzales RN  Outcome: Ongoing  12/28/2021 1824 by Evans Hudson RN  Outcome: Met This Shift  12/28/2021 1758 by Maurice Gonzales RN  Outcome: Ongoing     Problem: Fluid Volume - Imbalance:  Goal: Absence of imbalanced fluid volume signs and symptoms  Description: Absence of imbalanced fluid volume signs and symptoms  12/28/2021 1841 by Maurice Gonzales RN  Outcome: Ongoing  12/28/2021 1824 by Evans Hudson RN  Outcome: Met This Shift  12/28/2021 1758 by Maurice Gonzales RN  Outcome: Ongoing     Problem: Gas Exchange - Impaired:  Goal: Levels of oxygenation will improve  Description: Levels of oxygenation will improve  12/28/2021 1841 by Maurice Gonzales RN  Outcome: Ongoing  12/28/2021 1824 by Evans Hudson RN  Outcome: Met This Shift  12/28/2021 1758 by Maurice Gonzales RN  Outcome: Ongoing     Problem: Mental Status - Impaired:  Goal: Mental status will be restored to baseline  Description: Mental status will be restored to baseline  12/28/2021 1841 by Maurice Gonzales RN  Outcome: Ongoing  12/28/2021 1824 by Evans Hudson RN  Outcome: Met This Shift  12/28/2021 1758 by Maurice Gonzales RN  Outcome: Ongoing     Problem: Nutrition Deficit:  Goal: Ability to achieve adequate nutritional intake will improve  Description: Ability to achieve adequate nutritional intake will improve  12/28/2021 1841 by Maurice Gonzales RN  Outcome: Ongoing  12/28/2021 1824 by Evans Hudson RN  Outcome: Met This Shift  12/28/2021 1758 by Maurice Gonzales RN  Outcome: Ongoing     Problem: Pain:  Goal: Pain level will decrease  Description: Pain level will decrease  12/28/2021 1841 by Maurice Gonzales RN  Outcome: Ongoing  12/28/2021 1824 by Evans Hudson RN  Outcome: Met This Shift  12/28/2021 1758 by Maurice Gonzales RN  Outcome: Ongoing  Goal: Recognizes and communicates pain  Description: Recognizes and communicates pain  12/28/2021 1841 by Michelle Phillips TOMMIE Carrington  Outcome: Ongoing  12/28/2021 1824 by Eduarda Gilmore RN  Outcome: Met This Shift  12/28/2021 1758 by Rina Starks RN  Outcome: Ongoing  Goal: Control of acute pain  Description: Control of acute pain  12/28/2021 1841 by Rina Starks RN  Outcome: Ongoing  12/28/2021 1824 by Eduarda Gilmore RN  Outcome: Met This Shift  12/28/2021 1758 by Rina Starks RN  Outcome: Ongoing  Goal: Control of chronic pain  Description: Control of chronic pain  12/28/2021 1841 by Rina Starks RN  Outcome: Ongoing  12/28/2021 1824 by Eduarda Gilmore RN  Outcome: Met This Shift  12/28/2021 1758 by Rina Starks RN  Outcome: Ongoing     Problem: Serum Glucose Level - Abnormal:  Goal: Ability to maintain appropriate glucose levels will improve to within specified parameters  Description: Ability to maintain appropriate glucose levels will improve to within specified parameters  12/28/2021 1841 by Rina Starks RN  Outcome: Ongoing  12/28/2021 1758 by Rina Starks RN  Outcome: Ongoing     Problem: Skin Integrity - Impaired:  Goal: Will show no infection signs and symptoms  Description: Will show no infection signs and symptoms  12/28/2021 1841 by Rina Starks RN  Outcome: Ongoing  12/28/2021 1824 by Eduarda Gilmore RN  Outcome: Met This Shift  12/28/2021 1758 by Rina Starks RN  Outcome: Ongoing  Goal: Absence of new skin breakdown  Description: Absence of new skin breakdown  12/28/2021 1841 by Rina Starks RN  Outcome: Ongoing  12/28/2021 1824 by Eduarda Gilmore RN  Outcome: Met This Shift  12/28/2021 1758 by Rina Starks RN  Outcome: Ongoing     Problem: Sleep Pattern Disturbance:  Goal: Appears well-rested  Description: Appears well-rested  12/28/2021 1841 by Rina Starks RN  Outcome: Ongoing  12/28/2021 1824 by Eduarda Gilmore RN  Outcome: Met This Shift  12/28/2021 1758 by Rina Starks RN  Outcome: Ongoing     Problem: Tissue Perfusion, Altered:  Goal: Circulatory function within specified parameters  Description: Circulatory function within specified parameters  12/28/2021 1841 by Omayra Jorgensen RN  Outcome: Ongoing  12/28/2021 1824 by Elio Sweet RN  Outcome: Met This Shift  12/28/2021 1758 by Omayra Jorgensen RN  Outcome: Ongoing     Problem: Tissue Perfusion - Cardiopulmonary, Altered:  Goal: Absence of angina  Description: Absence of angina  12/28/2021 1841 by Omayra Jorgensen RN  Outcome: Ongoing  12/28/2021 1824 by Elio Sweet RN  Outcome: Met This Shift  12/28/2021 1758 by Omayra Jorgensen RN  Outcome: Ongoing  Goal: Hemodynamic stability will improve  Description: Hemodynamic stability will improve  12/28/2021 1841 by Omayra Jorgensen RN  Outcome: Ongoing  12/28/2021 1824 by Elio Sweet RN  Outcome: Met This Shift  12/28/2021 1758 by Omayra Jorgensen RN  Outcome: Ongoing     Problem: Pain:  Goal: Pain level will decrease  Description: Pain level will decrease  12/28/2021 1841 by Omayra Jorgensen RN  Outcome: Ongoing  12/28/2021 1824 by Elio Sweet RN  Outcome: Met This Shift  12/28/2021 1758 by Omayra Jorgensen RN  Outcome: Ongoing  Goal: Control of acute pain  Description: Control of acute pain  12/28/2021 1841 by Omayra Jorgensen RN  Outcome: Ongoing  12/28/2021 1824 by Elio Sweet RN  Outcome: Met This Shift  12/28/2021 1758 by Omayra Jorgensen RN  Outcome: Ongoing  Goal: Control of chronic pain  Description: Control of chronic pain  12/28/2021 1841 by Omayra Jorgensen RN  Outcome: Ongoing  12/28/2021 1824 by Elio Sweet RN  Outcome: Met This Shift  12/28/2021 1758 by Omayra Jorgensen RN  Outcome: Ongoing     Problem: Falls - Risk of:  Goal: Will remain free from falls  Description: Will remain free from falls  Outcome: Ongoing  Goal: Absence of physical injury  Description: Absence of physical injury  Outcome: Ongoing

## 2021-12-28 NOTE — OP NOTE
Operative Note      Patient: Synetta Schirmer  YOB: 1960  MRN: [id number]    DATE OF PROCEDURE: 12/28/2021     SURGEON: Lolly Otero M.D.     ASSISTANT: Maria A Miranda MD     PREOPERATIVE DIAGNOSIS:  Asymptomatic stenosis of the left internal carotid artery without infarction. POSTOPERATIVE DIAGNOSIS: Same  with severe calcific atherosclerotic disease and a near occlusive stenosis of the internal carotid artery    OPERATION: Left carotid endarterectomy with bovine patch angioplasty. ANESTHESIA: General endotracheal anesthesia     ESTIMATED BLOOD LOSS: less than 50 ml     COMPLICATIONS: None     DESCRIPTION OF PROCEDURE: The patient was identified and the procedure was confirmed. The left neck was prepped and draped in the usual sterile fashion. A skin incision was made along the anterior border of the sternocleidomastoid muscle and carried down through the subcutaneous tissue. Dissection continued through the platysma and along the anterior border of the sternocleidomastoid muscle. The common facial vein was divided between silk ties. The common carotid artery was identified and dissected free from the surrounding tissues. It was surrounded proximally in the soft portion with an umbilical tape. The vagus nerve was deep to the artery and preserved. Dissection continued along the carotid artery and the external carotid artery and its superior thyroid branch were dissected free from the surrounding tissues and surrounded with vessel loops for control. The patient was then heparinized, maintaining an activated clotting time greater than 300 seconds. Dissection continued along the internal carotid artery, which was freed from the surrounding tissues and surrounded with a vessel loop for control. The hypoglossal nerve was identified and preserved. The vessel loops on the external carotid artery branches were controlled and the internal carotid artery was clamped.  The common carotid artery was clamped we then performed back pressures. The back pressure was approximately 52 mmHg. We then performed a longitudinal arteriotomy was made in the common carotid artery and extended through the bulb and onto the internal carotid artery. There was a 90 plus % stenosis in the origin of the internal carotid artery. A standard endarterectomy was then performed of the obtaining smooth end points on the distal common and internal carotid arteries. Loose fibrinous debris was removed from the vessel bed, which was flushed with heparinized saline solution. 7-0(tacking)  A bovine patch was obtained and cut to the appropriate length and sewn to the artery using a running 6-0 Prolene suture. Everything was then backbled and forward flushed in the standard fashion. The closure was completed and flow was restored initially through the external carotid artery followed by the internal carotid artery. Flow through the vessels was confirmed with the handheld Doppler probe. The patient was given protamine and hemostasis was obtained. The incision was irrigated with antibiotic saline solution. The platysma was approximated with interrupted 3-0 Vicryl sutures and 0.25% Marcaine was injected into the subcutaneous tissue surrounding the incision. The skin was closed with a subcuticular Vicryl stitch and skin adhesive was applied to the skin incision in the operating room. Needle, sponge, and instrument counts were reported as correct x2. The patient tolerated the procedure and was transferred to the Cardiovascular ICU in satisfactory condition. Specimens:   ID Type Source Tests Collected by Time Destination   A : left carotid plaque Tissue Tissue SURGICAL PATHOLOGY Nikos Colvin MD 12/28/2021 0330        Implants:  Implant Name Type Inv.  Item Serial No.  Lot No. LRB No. Used Action   GRAFT VASC W0.8XL8CM THK0.35-0.75MM CAR PERICARD PROC BOV Vascular grafts GRAFT VASC W0.8XL8CM THK0.35-0.75MM CAR PERICARD PROC BOV  [facility] VASCULAR [facility]- [id number] Left 1 Implanted         Drains:   Urethral Catheter Non-latex 16 fr (Active)       Electronically signed by John Avila MD on 12/28/2021 at 1:06 PM

## 2021-12-28 NOTE — H&P
Update History & Physical    The patient's History and Physical of December 22, 2021 was reviewed with the patient and I examined the patient. There was no change. Last dose aspirin 1-2 days ago. No abx allergy. Neurologically fully intact on my exam today. The surgical site was confirmed by the patient and me. Plan: The risks, benefits, expected outcome, and alternative to the recommended procedure have been discussed with the patient. Patient understands and wants to proceed with the procedure. Electronically signed by Jaci Vazquez MD on 12/28/2021 at 7:25 AM      Patient was seen and examined. I did discussion with him again today with his family at the bedside. We went over risk benefits and alternatives including but not limited to bleeding, infection, arteriovenous nerve injury, myocardial infarction, respiratory failure, stroke, sensorimotor disturbance, swallowing difficulty, and/or death. He understands and wishes to proceed. I also told him that this is a high lesio which may make the case more difficult. He verbalizes understanding again and wishes to proceed    Arya Chambers      Vascular Surgery Outpatient Progress Note             Chief Complaint   Patient presents with    Pre-op Exam       left CEA         HISTORY OF PRESENT ILLNESS:                 The patient is a 59 y.o. male who returns for follow-up evaluation of high-grade left carotid artery disease. Has been seen and evaluated by my partner. Currently smoking approximately 1 pack of cigarettes per day. Initially dropped down but secondary to stress increase that again. He denies any right-sided left-sided weakness numbness or vision changes. He denies any chest pain shortness of breath or discomfort. He is on the schedule for a left carotid endarterectomy on the 28th.   He is here to discuss the procedure.     Past Medical History:    Past Medical History             Diagnosis Date    Bilateral carotid artery stenosis 9/9/2021    Bilateral carotid bruits 9/9/2021    IVAN (cerebral atherosclerosis)      HTN (hypertension)      Hypercholesteremia      Intracranial carotid stenosis, bilateral 10/22/2021    Tobacco dependence 9/9/2021         Past Surgical History:    Past Surgical History             Procedure Laterality Date    CATARACT REMOVAL             Current Medications:   Home Medications           Prior to Admission medications    Medication Sig Start Date End Date Taking? Authorizing Provider   amLODIPine (NORVASC) 5 MG tablet Take 10 mg by mouth daily      Yes Historical Provider, MD   metoprolol succinate (TOPROL XL) 25 MG extended release tablet Take 25 mg by mouth daily     Yes Historical Provider, MD   rosuvastatin (CRESTOR) 10 MG tablet Take 10 mg by mouth daily     Yes Historical Provider, MD   aspirin 81 MG EC tablet Take 81 mg by mouth daily     Yes Historical Provider, MD   cilostazol (PLETAL) 100 MG tablet Take 1 tablet by mouth 2 times daily  Patient taking differently: Take 100 mg by mouth 2 times daily Hold the morning of surgery.  9/9/21 12/17/21   Mal George MD         Allergies:  Patient has no known allergies.     Social History               Socioeconomic History    Marital status:        Spouse name: Not on file    Number of children: Not on file    Years of education: Not on file    Highest education level: Not on file   Occupational History    Not on file   Tobacco Use    Smoking status: Current Every Day Smoker    Smokeless tobacco: Never Used   Substance and Sexual Activity    Alcohol use: Not Currently    Drug use: Never    Sexual activity: Not on file   Other Topics Concern    Not on file   Social History Narrative    Not on file      Social Determinants of Health          Financial Resource Strain:     Difficulty of Paying Living Expenses: Not on file   Food Insecurity:     Worried About [address] [employer] in the Last Year: Not on file    [address] in the Last Year: Not on file   Transportation Needs:     Lack of Transportation (Medical): Not on file    Lack of Transportation (Non-Medical): Not on file   Physical Activity:     Days of Exercise per Week: Not on file    Minutes of Exercise per Session: Not on file   Stress:     Feeling of Stress : Not on file   Social Connections:     Frequency of Communication with Friends and Family: Not on file    Frequency of Social Gatherings with Friends and Family: Not on file    Attends [employer] Services: Not on file    Active Member of [address] [place] [address] [employer] or Organizations: Not on file    Attends Club or Organization Meetings: Not on file    Marital Status: Not on file   Intimate Partner Violence:     Fear of Current or Ex-Partner: Not on file    Emotionally Abused: Not on file    Physically Abused: Not on file    Sexually Abused: Not on file   Housing Stability:     Unable to Pay for Housing in the Last Year: Not on file    Number of [place] in the Last Year: Not on file    Unstable Housing in the Last Year: Not on file            Family History   History reviewed. No pertinent family history.        REVIEW OF SYSTEMS:     Constitutional: Negative for activity change, appetite change, chills, diaphoresis, fatigue, fever and unexpected weight change. HEENT: Negative for congestion, ear discharge, ear pain, hearing loss, nosebleeds, rhinorrhea, sinus pain, sore throat, tinnitus, trouble swallowing and voice change. Eyes: Negative for photophobia, pain, discharge, redness, itching and visual disturbance. Respiratory: Negative for apnea, cough, chest tightness, shortness of breath and wheezing. Cardiovascular: Negative for chest pain, palpitations and leg swelling. Gastrointestinal: Negative for abdominal distention, abdominal pain, blood in stool, constipation, diarrhea, nausea and vomiting. Endocrine: Negative for cold intolerance, heat intolerance, polydipsia, polyphagia and polyuria. Genitourinary: Negative for decreased urine volume, difficulty urinating, dysuria, frequency, hematuria and urgency. Musculoskeletal: Negative for arthralgias, back pain, gait problem, joint swelling and neck pain. Skin: Negative for color change, pallor, rash and wound. Allergic/Immunologic: Negative for environmental allergies, food allergies and immunocompromised state. Neurological: Negative for dizziness, syncope, facial asymmetry, speech difficulty, weakness, light-headedness, numbness and headaches. Hematological: Negative for adenopathy. Does not bruise/bleed easily. Psychiatric/Behavioral: Negative for agitation, confusion, sleep disturbance and suicidal ideas. The patient is not nervous/anxious.                PHYSICAL EXAM:      Vitals:     12/22/21 0958   BP: (!) 140/78      General Appearance: alert and oriented to person, place and time, well developed and well- nourished, in no acute distress  Skin: warm and dry, no rash or erythema  Head: normocephalic and atraumatic  Eyes: extraocular eye movements intact, conjunctivae normal  ENT: external ear and ear canal normal bilaterally, nose without deformity  Pulmonary/Chest: clear to auscultation bilaterally- no wheezes, rales or rhonchi, normal air movement, no respiratory distress  Cardiovascular: normal rate, regular rhythm, normal S1 and S2, no murmurs, no carotid bruits  Abdomen: soft, non-tender, non-distended, normal bowel sounds, no masses or organomegaly  Musculoskeletal: normal range of motion, no joint swelling, deformity or tenderness  Neurologic: no cranial nerve deficit, gait, coordination and speech normal  Extremities: Bilateral palpable brachial and radial pulses.                 Problem List Items Addressed This Visit      None             #1 high-grade left carotid artery disease. His velocities are elevated in the 500 range. He has a high-grade left carotid stenosis. This is a couple centimeters above the bifurcation. The rest of the vessel is unremarkable. The right side demonstrates mild atherosclerotic disease.     He has mild subclavian disease but has nice palpable pulses. He has intracranial right carotid disease at the internal carotid cavernous portion. He has mild atherosclerotic disease of the right internal carotid and a high-grade left internal carotid stenosis.     At this point were planning a left carotid endarterectomy. Have gone over risk benefits and alternatives with him     Risk benefits alternatives include bleeding, infection, arteriovenous nerve injury, myocardial infarction, respiratory failure, anesthetic reaction, pain swelling or tenderness, swelling, stroke, and/or death she understands wishes to proceed           No follow-ups on file.

## 2021-12-28 NOTE — ANESTHESIA PRE PROCEDURE
Bilateral carotid artery stenosis 9/9/2021    Bilateral carotid bruits 9/9/2021    IVAN (cerebral atherosclerosis)     HTN (hypertension)     Hypercholesteremia     Intracranial carotid stenosis, bilateral 10/22/2021    Tobacco dependence 9/9/2021       Past Surgical History:        Procedure Laterality Date    CATARACT REMOVAL         Social History:    Social History     Tobacco Use    Smoking status: Current Every Day Smoker    Smokeless tobacco: Never Used   Substance Use Topics    Alcohol use: Not Currently                                Ready to quit: Not Answered  Counseling given: Not Answered      Vital Signs (Current):   Vitals:    12/17/21 1121 12/28/21 0535   BP:  (!) 170/87   Pulse:  96   Resp:  20   Temp:  36.6 °C (97.9 °F)   TempSrc:  Temporal   SpO2:  96%   Weight: 164 lb (74.4 kg) 166 lb (75.3 kg)   Height:  5' 7\" (1.702 m)                                              BP Readings from Last 3 Encounters:   12/28/21 (!) 170/87   12/22/21 (!) 140/78   12/22/21 (!) 146/73       NPO Status: Time of last liquid consumption: 2100                        Time of last solid consumption: 2100                        Date of last liquid consumption: 12/27/21                        Date of last solid food consumption: 12/27/21    BMI:   Wt Readings from Last 3 Encounters:   12/28/21 166 lb (75.3 kg)   12/22/21 167 lb (75.8 kg)   12/22/21 166 lb 4.8 oz (75.4 kg)     Body mass index is 26 kg/m².     CBC:   Lab Results   Component Value Date    WBC 10.0 12/22/2021    RBC 4.95 12/22/2021    HGB 15.0 12/22/2021    HCT 43.8 12/22/2021    MCV 88.5 12/22/2021    RDW 13.4 12/22/2021     12/22/2021       CMP:   Lab Results   Component Value Date     12/22/2021    K 3.6 12/22/2021     12/22/2021    CO2 25 12/22/2021    BUN 7 12/22/2021    CREATININE 0.7 12/22/2021    GFRAA >60 12/22/2021    LABGLOM >60 12/22/2021    GLUCOSE 111 12/22/2021    CALCIUM 9.6 12/22/2021       POC Tests: No results for input(s): POCGLU, POCNA, POCK, POCCL, POCBUN, POCHEMO, POCHCT in the last 72 hours. Coags:   Lab Results   Component Value Date    PROTIME 11.3 12/22/2021    INR 1.0 12/22/2021       HCG (If Applicable): No results found for: PREGTESTUR, PREGSERUM, HCG, HCGQUANT     ABGs: No results found for: PHART, PO2ART, UXE6GJM, TZS2BWX, BEART, M8KZBXOZ     Type & Screen (If Applicable):  No results found for: LABABO, LABRH    Drug/Infectious Status (If Applicable):  No results found for: HIV, HEPCAB    COVID-19 Screening (If Applicable): No results found for: COVID19    CARDIAC STRESS TEST 1/9/20    Impression   Moderate-sized partially reversible perfusion defect in the inferior   wall. The finding may be due to attenuation artifact. No significant wall motion abnormality. Estimated left ventricular ejection fraction is 57%.         CTA NECK 10/6/21    Impression   1. Irregular calcified plaque about both carotid bifurcations and proximal   ICAs.  Some ulceration is seen about the proximal right ICA but there is no   evidence of hemodynamically significant stenosis of the right ICA by NASCET   criteria. 2. Severe 80% stenosis of the proximal to mid left ICA, about 2 cm from its   origin. 3. Irregular calcified plaque about both intra cavernous internal carotid   arteries causing stenosis about 70% on the right and 50% on the left. 4. Bilateral patent vertebral arteries.  The left vertebral artery is   dominant without significant stenosis. Patrica Finders is about 80% focal stenosis at   the origin of the right vertebral artery. 5. 1 cm left lower pole thyroid nodule.  No follow-up imaging is recommended.       RECOMMENDATIONS:   1 cm incidental left thyroid nodule. No follow-up imaging is recommended. Reference: J Am Graham Radiol.  2015 Feb;12(2): 143-50               Anesthesia Evaluation  Patient summary reviewed and Nursing notes reviewed no history of anesthetic complications:   Airway: Mallampati: III  TM distance: >3 FB   Neck ROM: full  Mouth opening: > = 3 FB Dental:          Pulmonary:normal exam  breath sounds clear to auscultation  (+) current smoker          Patient did not smoke on day of surgery. Cardiovascular:  Exercise tolerance: good (>4 METS),   (+) hypertension: moderate, hyperlipidemia      ECG reviewed  Rhythm: regular  Rate: normal    Stress test reviewed       Beta Blocker:  Dose within 24 Hrs      ROS comment: EKG 10/27/21  Sinus  Rhythm  rate 78   occasional ectopic ventricular beat     Nonspecific T-abnormality. Neuro/Psych:   Negative Neuro/Psych ROS              GI/Hepatic/Renal:   (+) GERD:,           Endo/Other: Negative Endo/Other ROS                    Abdominal:             Vascular:   + PVD, aortic or cerebral (  IVAN (cerebral atherosclerosis), . Other Findings:           Anesthesia Plan      general     ASA 4       Induction: intravenous. BIS and arterial line  MIPS: Prophylactic antiemetics administered. Anesthetic plan and risks discussed with patient. Use of blood products discussed with patient whom consented to blood products. Plan discussed with CRNA and attending.                 Joaquin Gonzalez RN   12/28/2021

## 2021-12-28 NOTE — PLAN OF CARE
Problem: Discharge Planning:  Goal: Participates in care planning  Description: Participates in care planning  Outcome: Ongoing  Goal: Discharged to appropriate level of care  Description: Discharged to appropriate level of care  Outcome: Ongoing     Problem: Airway Clearance - Ineffective:  Goal: Ability to maintain a clear airway will improve  Description: Ability to maintain a clear airway will improve  Outcome: Ongoing     Problem: Anxiety/Stress:  Goal: Level of anxiety will decrease  Description: Level of anxiety will decrease  Outcome: Ongoing     Problem: Aspiration:  Goal: Absence of aspiration  Description: Absence of aspiration  Outcome: Ongoing     Problem:  Bowel Function - Altered:  Goal: Bowel elimination is within specified parameters  Description: Bowel elimination is within specified parameters  Outcome: Ongoing     Problem: Cardiac Output - Decreased:  Goal: Hemodynamic stability will improve  Description: Hemodynamic stability will improve  Outcome: Ongoing     Problem: Fluid Volume - Imbalance:  Goal: Absence of imbalanced fluid volume signs and symptoms  Description: Absence of imbalanced fluid volume signs and symptoms  Outcome: Ongoing     Problem: Gas Exchange - Impaired:  Goal: Levels of oxygenation will improve  Description: Levels of oxygenation will improve  Outcome: Ongoing     Problem: Mental Status - Impaired:  Goal: Mental status will be restored to baseline  Description: Mental status will be restored to baseline  Outcome: Ongoing     Problem: Nutrition Deficit:  Goal: Ability to achieve adequate nutritional intake will improve  Description: Ability to achieve adequate nutritional intake will improve  Outcome: Ongoing     Problem: Pain:  Goal: Pain level will decrease  Description: Pain level will decrease  Outcome: Ongoing  Goal: Recognizes and communicates pain  Description: Recognizes and communicates pain  Outcome: Ongoing  Goal: Control of acute pain  Description: Control of

## 2021-12-28 NOTE — DISCHARGE INSTR - MEDS
Carotid Stenosis and Carotid Endarterectomy  Care After Surgery     Refer to this sheet in the next few weeks. These discharge instructions provide you with general information on caring for yourself after you leave the hospital. Your caregiver may also give you specific instructions. Your treatment has been planned according to the most current medical practices available, but unavoidable complications sometimes occur. If you have any problems or questions after discharge, please call your caregiver. HOME CARE INSTRUCTIONS  Ø It is normal to be sore for a couple weeks after surgery. See your caregiver if this seems to be getting worse rather than better. Ø Take showers, not baths, for a few days after surgery or until instructed otherwise by your caregiver. Do not take baths or swim until directed by your surgeon. Ø Only take over-the-counter or prescription medicines for pain, discomfort, or fever as directed by your caregiver. Ø A blood thinner (anticoagulant) may be prescribed after surgery. This medicine should be taken exactly as directed. Ø Change bandages (dressings) as directed by your caregiver. Ø Resume your normal activities as directed by your caregiver. Ø Avoid lifting until you are instructed otherwise. Ø Make an appointment to see your caregiver for stitches (suture) or staple removal when instructed. Ø Stop smoking if you smoke. This is a grave risk factor. Ø Stop taking the pill (oral contraceptives) unless your caregiver recommends otherwise. Ø Maintain good blood pressure control. Ø Exercise regularly or as instructed. Ø Lower blood lipids (cholesterol and triglycerides). Ø Eat a heart-healthy diet. Manage heart problems if they are contributing to your risk. SEEK MEDICAL CARE IF:  Ø There is increased bleeding from the wound. Ø You notice redness, swelling, or increasing pain in the wound. Ø You notice swelling in your neck or have difficulty breathing or talking.   Ø You notice a bad smell or pus coming from the wound or dressing. Ø You have an oral temperature above 102º F (38.9º C). SEEK IMMEDIATE MEDICAL CARE IF:  Ø Your initial symptoms are getting worse rather than better. Ø You develop any abnormal bruising or bleeding. Ø You develop a rash. Ø You have difficulty breathing. Ø You develop any reaction or side effects to medicine given. Ø You develop chest pain, shortness of breath, or pain or swelling in your legs. Ø You have a return of symptoms or problems that caused you to have this surgery. Ø You develop a temporary loss of vision. Ø You develop temporary numbness on one side. Ø You develop a temporary inability to speak (aphasia). Ø You develop temporary areas of weakness. Ø You have problems or concerns that have not been answered. MAKE SURE YOU:  Ø Understand these instructions. Ø Will watch your condition. Ø Will get help right away if you are not doing well or get worse. Document Released: 01/01/2000  Document Re-Released: 03/14/2011  Exit[employer]® Patient Information ©2011 [employer]. Other Instructions:    No driving for 2 weeks. OK to shower. Wash incision daily with soap and water. FOLLOW-UP CARE:  [x]Call the office at [phone] for follow-up appointment in 2 weeks.

## 2021-12-28 NOTE — PLAN OF CARE
Problem: Discharge Planning:  Goal: Participates in care planning  Description: Participates in care planning  12/28/2021 1824 by Addis Jose RN  Outcome: Met This Shift  12/28/2021 1758 by Huseyin Sutton RN  Outcome: Ongoing  Goal: Discharged to appropriate level of care  Description: Discharged to appropriate level of care  12/28/2021 1824 by Addis Jose RN  Outcome: Met This Shift  12/28/2021 1758 by Huseyin Sutton RN  Outcome: Ongoing     Problem: Airway Clearance - Ineffective:  Goal: Ability to maintain a clear airway will improve  Description: Ability to maintain a clear airway will improve  12/28/2021 1824 by Addis Jose RN  Outcome: Met This Shift  12/28/2021 1758 by Huseyin Sutton RN  Outcome: Ongoing     Problem: Anxiety/Stress:  Goal: Level of anxiety will decrease  Description: Level of anxiety will decrease  12/28/2021 1824 by Addis Jose RN  Outcome: Met This Shift  12/28/2021 1758 by Huseyin Sutton RN  Outcome: Ongoing     Problem: Aspiration:  Goal: Absence of aspiration  Description: Absence of aspiration  12/28/2021 1824 by Addis Joes RN  Outcome: Met This Shift  12/28/2021 1758 by Huseyin Sutton RN  Outcome: Ongoing     Problem:  Bowel Function - Altered:  Goal: Bowel elimination is within specified parameters  Description: Bowel elimination is within specified parameters  12/28/2021 1824 by Addis Jose RN  Outcome: Met This Shift  12/28/2021 1758 by Huseyin Sutton RN  Outcome: Ongoing     Problem: Cardiac Output - Decreased:  Goal: Hemodynamic stability will improve  Description: Hemodynamic stability will improve  12/28/2021 1824 by Addis Jose RN  Outcome: Met This Shift  12/28/2021 1758 by Huseyin Sutton RN  Outcome: Ongoing     Problem: Fluid Volume - Imbalance:  Goal: Absence of imbalanced fluid volume signs and symptoms  Description: Absence of imbalanced fluid volume signs and symptoms  12/28/2021 1824 by Addis Jose RN  Outcome: Met This Shift  12/28/2021 1758 by Wilfredo Alfonso RN  Outcome: Ongoing     Problem: Gas Exchange - Impaired:  Goal: Levels of oxygenation will improve  Description: Levels of oxygenation will improve  12/28/2021 1824 by Jose Manuel Stapleton RN  Outcome: Met This Shift  12/28/2021 1758 by Wilfredo Alfonso RN  Outcome: Ongoing     Problem: Mental Status - Impaired:  Goal: Mental status will be restored to baseline  Description: Mental status will be restored to baseline  12/28/2021 1824 by Jose Manuel Stapleton RN  Outcome: Met This Shift  12/28/2021 1758 by Wilfredo Alfonso RN  Outcome: Ongoing     Problem: Nutrition Deficit:  Goal: Ability to achieve adequate nutritional intake will improve  Description: Ability to achieve adequate nutritional intake will improve  12/28/2021 1824 by Jose Manuel Stapleton RN  Outcome: Met This Shift  12/28/2021 1758 by Wilfredo Alfonso RN  Outcome: Ongoing     Problem: Pain:  Goal: Pain level will decrease  Description: Pain level will decrease  12/28/2021 1824 by Jose Manuel Stapleton RN  Outcome: Met This Shift  12/28/2021 1758 by Wilfredo Alfonso RN  Outcome: Ongoing  Goal: Recognizes and communicates pain  Description: Recognizes and communicates pain  12/28/2021 1824 by Jose Manuel Stapleton RN  Outcome: Met This Shift  12/28/2021 1758 by Wilfredo Alfonso RN  Outcome: Ongoing  Goal: Control of acute pain  Description: Control of acute pain  12/28/2021 1824 by Jose Manuel Stapleton RN  Outcome: Met This Shift  12/28/2021 1758 by Wilfredo Alfonso RN  Outcome: Ongoing  Goal: Control of chronic pain  Description: Control of chronic pain  12/28/2021 1824 by Jose Manuel Stapleton RN  Outcome: Met This Shift  12/28/2021 1758 by Wilfredo Alfonso RN  Outcome: Ongoing     Problem: Skin Integrity - Impaired:  Goal: Will show no infection signs and symptoms  Description: Will show no infection signs and symptoms  12/28/2021 1824 by Jose Manuel Stapleton RN  Outcome: Met This Shift  12/28/2021 1758 by Jenae Rangel RN  Outcome: Ongoing  Goal: Absence of new skin breakdown  Description: Absence of new skin breakdown  12/28/2021 1824 by Jazzy Friend RN  Outcome: Met This Shift  12/28/2021 1758 by Jenae Rangel RN  Outcome: Ongoing     Problem: Sleep Pattern Disturbance:  Goal: Appears well-rested  Description: Appears well-rested  12/28/2021 1824 by Jazzy Friend RN  Outcome: Met This Shift  12/28/2021 1758 by Jenae Rangel RN  Outcome: Ongoing     Problem: Tissue Perfusion, Altered:  Goal: Circulatory function within specified parameters  Description: Circulatory function within specified parameters  12/28/2021 1824 by Jazzy Friend RN  Outcome: Met This Shift  12/28/2021 1758 by Jenae Rangel RN  Outcome: Ongoing     Problem: Tissue Perfusion - Cardiopulmonary, Altered:  Goal: Absence of angina  Description: Absence of angina  12/28/2021 1824 by Jazzy Friend RN  Outcome: Met This Shift  12/28/2021 1758 by Jenae Rangel RN  Outcome: Ongoing  Goal: Hemodynamic stability will improve  Description: Hemodynamic stability will improve  12/28/2021 1824 by Jazzy Friend RN  Outcome: Met This Shift  12/28/2021 1758 by Jenae Rangel RN  Outcome: Ongoing     Problem: Pain:  Goal: Pain level will decrease  Description: Pain level will decrease  12/28/2021 1824 by Jazzy Friend RN  Outcome: Met This Shift  12/28/2021 1758 by Jenae Rangel RN  Outcome: Ongoing  Goal: Control of acute pain  Description: Control of acute pain  12/28/2021 1824 by Jazzy Friend RN  Outcome: Met This Shift  12/28/2021 1758 by Jenae Rangel RN  Outcome: Ongoing  Goal: Control of chronic pain  Description: Control of chronic pain  12/28/2021 1824 by Jazzy Friend RN  Outcome: Met This Shift  12/28/2021 1758 by Jenae Rangel RN  Outcome: Ongoing

## 2021-12-28 NOTE — PROGRESS NOTES
[facility] Admission Note    Name: Tanner Wright  MRN: [id number]    CC: Postoperative Critical Care Management     Indication for Surgery/Procedure: asymptomatic left internal carotid artery stenosis     Important/Relevant PMH/PSH: HTN, HLD, current smoker     Procedure/Surgeries: 12/28/2021  Left carotid endarterectomy with bovine patch angioplasty       Physical Exam:    BP (!) 157/66   Pulse 88   Temp 98.5 °F (36.9 °C) (Temporal)   Resp 19   Ht 5' 7\" (1.702 m)   Wt 166 lb (75.3 kg)   SpO2 98%   BMI 26.00 kg/m²     No results for input(s): WBC, RBC, HGB, HCT, MCV, MCH, MCHC, RDW, PLT, MPV in the last 72 hours. No results for input(s): NA, K, CL, CO2, BUN, CREATININE, GLUCOSE, CALCIUM in the last 72 hours. General Appearance: Arrived to pacu in stable condition   Eyes: PERRL  Pulmonary: No wheezes, no accessory muscle use noted   Cardiovascular: RRR, no heaves or thrills palpated   Telemetry: SR  Neurologic/Psych: Alert and oriented, following commands appropriately, equal in strength bilaterally, tongue midline   Skin: Warm and dry   Incision: Left neck incision soft, well approximated     Assessment/Plan: Day of Surgery     1.  S/p Left carotid endarterectomy with bovine patch angioplasty   - Frequent neurovascular checks, monitor incision for signs of swelling/hematoma   - NPO, IVF @ 75cc/hr  - Crenshaw catheter to GD  - Bedrest  - Ancef        Electronically signed by JES Guaman - CNP on 12/28/2021 at 1:36 PM

## 2021-12-29 ENCOUNTER — TELEPHONE (OUTPATIENT)
Dept: VASCULAR SURGERY | Age: 61
End: 2021-12-29

## 2021-12-29 VITALS
HEIGHT: 67 IN | HEART RATE: 101 BPM | BODY MASS INDEX: 26.06 KG/M2 | RESPIRATION RATE: 15 BRPM | SYSTOLIC BLOOD PRESSURE: 160 MMHG | WEIGHT: 166 LBS | OXYGEN SATURATION: 92 % | TEMPERATURE: 98.2 F | DIASTOLIC BLOOD PRESSURE: 79 MMHG

## 2021-12-29 LAB
ACTIVATED CLOTTING TIME: 205 SECONDS (ref 99–130)
ANION GAP SERPL CALCULATED.3IONS-SCNC: 15 MMOL/L (ref 7–16)
BUN BLDV-MCNC: 12 MG/DL (ref 6–23)
CALCIUM SERPL-MCNC: 8.8 MG/DL (ref 8.6–10.2)
CHLORIDE BLD-SCNC: 103 MMOL/L (ref 98–107)
CO2: 21 MMOL/L (ref 22–29)
CREAT SERPL-MCNC: 0.6 MG/DL (ref 0.7–1.2)
EKG ATRIAL RATE: 113 BPM
EKG ATRIAL RATE: 89 BPM
EKG P AXIS: 55 DEGREES
EKG P AXIS: 60 DEGREES
EKG P-R INTERVAL: 146 MS
EKG P-R INTERVAL: 164 MS
EKG Q-T INTERVAL: 350 MS
EKG Q-T INTERVAL: 390 MS
EKG QRS DURATION: 100 MS
EKG QRS DURATION: 94 MS
EKG QTC CALCULATION (BAZETT): 474 MS
EKG QTC CALCULATION (BAZETT): 480 MS
EKG R AXIS: 84 DEGREES
EKG R AXIS: 89 DEGREES
EKG T AXIS: 62 DEGREES
EKG T AXIS: 80 DEGREES
EKG VENTRICULAR RATE: 113 BPM
EKG VENTRICULAR RATE: 89 BPM
GFR AFRICAN AMERICAN: >60
GFR NON-AFRICAN AMERICAN: >60 ML/MIN/1.73
GLUCOSE BLD-MCNC: 180 MG/DL (ref 74–99)
HCT VFR BLD CALC: 43.2 % (ref 37–54)
HEMOGLOBIN: 14.4 G/DL (ref 12.5–16.5)
MCH RBC QN AUTO: 30.5 PG (ref 26–35)
MCHC RBC AUTO-ENTMCNC: 33.3 % (ref 32–34.5)
MCV RBC AUTO: 91.5 FL (ref 80–99.9)
PDW BLD-RTO: 13.4 FL (ref 11.5–15)
PLATELET # BLD: 352 E9/L (ref 130–450)
PMV BLD AUTO: 9.1 FL (ref 7–12)
POC ACT LR: 135 SECONDS
POC ACT LR: 138 SECONDS
POC ACT LR: 237 SECONDS
POC ACT LR: 358 SECONDS
POTASSIUM SERPL-SCNC: 3.2 MMOL/L (ref 3.5–5)
RBC # BLD: 4.72 E12/L (ref 3.8–5.8)
SODIUM BLD-SCNC: 139 MMOL/L (ref 132–146)
TROPONIN, HIGH SENSITIVITY: 12 NG/L (ref 0–11)
TROPONIN, HIGH SENSITIVITY: 14 NG/L (ref 0–11)
WBC # BLD: 21.5 E9/L (ref 4.5–11.5)

## 2021-12-29 PROCEDURE — 6370000000 HC RX 637 (ALT 250 FOR IP): Performed by: NURSE PRACTITIONER

## 2021-12-29 PROCEDURE — 6360000002 HC RX W HCPCS: Performed by: SURGERY

## 2021-12-29 PROCEDURE — 99024 POSTOP FOLLOW-UP VISIT: CPT | Performed by: SURGERY

## 2021-12-29 PROCEDURE — 80048 BASIC METABOLIC PNL TOTAL CA: CPT

## 2021-12-29 PROCEDURE — 99222 1ST HOSP IP/OBS MODERATE 55: CPT | Performed by: INTERNAL MEDICINE

## 2021-12-29 PROCEDURE — 6370000000 HC RX 637 (ALT 250 FOR IP): Performed by: SURGERY

## 2021-12-29 PROCEDURE — 6360000002 HC RX W HCPCS: Performed by: NURSE PRACTITIONER

## 2021-12-29 PROCEDURE — 37799 UNLISTED PX VASCULAR SURGERY: CPT

## 2021-12-29 PROCEDURE — 84484 ASSAY OF TROPONIN QUANT: CPT

## 2021-12-29 PROCEDURE — 93005 ELECTROCARDIOGRAM TRACING: CPT | Performed by: SURGERY

## 2021-12-29 PROCEDURE — 2500000003 HC RX 250 WO HCPCS: Performed by: SURGERY

## 2021-12-29 PROCEDURE — 6370000000 HC RX 637 (ALT 250 FOR IP)

## 2021-12-29 PROCEDURE — APPSS60 APP SPLIT SHARED TIME 46-60 MINUTES: Performed by: PHYSICIAN ASSISTANT

## 2021-12-29 PROCEDURE — 93005 ELECTROCARDIOGRAM TRACING: CPT | Performed by: INTERNAL MEDICINE

## 2021-12-29 PROCEDURE — 85027 COMPLETE CBC AUTOMATED: CPT

## 2021-12-29 PROCEDURE — 36415 COLL VENOUS BLD VENIPUNCTURE: CPT

## 2021-12-29 RX ORDER — ROSUVASTATIN CALCIUM 20 MG/1
40 TABLET, COATED ORAL DAILY
Status: DISCONTINUED | OUTPATIENT
Start: 2021-12-30 | End: 2021-12-29 | Stop reason: HOSPADM

## 2021-12-29 RX ORDER — CALCIUM CARBONATE 200(500)MG
TABLET,CHEWABLE ORAL
Status: COMPLETED
Start: 2021-12-29 | End: 2021-12-29

## 2021-12-29 RX ORDER — CALCIUM CARBONATE 200(500)MG
500 TABLET,CHEWABLE ORAL 3 TIMES DAILY PRN
Status: DISCONTINUED | OUTPATIENT
Start: 2021-12-29 | End: 2021-12-29 | Stop reason: HOSPADM

## 2021-12-29 RX ADMIN — CALCIUM CARBONATE 500 MG: 500 TABLET, CHEWABLE ORAL at 13:16

## 2021-12-29 RX ADMIN — ASPIRIN 81 MG: 81 TABLET, COATED ORAL at 08:01

## 2021-12-29 RX ADMIN — ONDANSETRON 4 MG: 2 INJECTION INTRAMUSCULAR; INTRAVENOUS at 06:59

## 2021-12-29 RX ADMIN — METOPROLOL SUCCINATE 25 MG: 25 TABLET, EXTENDED RELEASE ORAL at 08:01

## 2021-12-29 RX ADMIN — OXYCODONE HYDROCHLORIDE 10 MG: 10 TABLET ORAL at 05:48

## 2021-12-29 RX ADMIN — LABETALOL HYDROCHLORIDE 10 MG: 5 INJECTION INTRAVENOUS at 07:01

## 2021-12-29 RX ADMIN — CILOSTAZOL 100 MG: 100 TABLET ORAL at 08:01

## 2021-12-29 RX ADMIN — ROSUVASTATIN 10 MG: 20 TABLET, FILM COATED ORAL at 08:01

## 2021-12-29 RX ADMIN — CALCIUM CARBONATE 500 MG: 500 TABLET, CHEWABLE ORAL at 14:45

## 2021-12-29 RX ADMIN — AMLODIPINE BESYLATE 10 MG: 10 TABLET ORAL at 08:01

## 2021-12-29 RX ADMIN — ACETAMINOPHEN 650 MG: 325 TABLET ORAL at 05:49

## 2021-12-29 RX ADMIN — HYDRALAZINE HYDROCHLORIDE 10 MG: 20 INJECTION INTRAMUSCULAR; INTRAVENOUS at 06:05

## 2021-12-29 RX ADMIN — Medication 2000 MG: at 03:50

## 2021-12-29 ASSESSMENT — PAIN SCALES - GENERAL
PAINLEVEL_OUTOF10: 0
PAINLEVEL_OUTOF10: 7
PAINLEVEL_OUTOF10: 3
PAINLEVEL_OUTOF10: 0
PAINLEVEL_OUTOF10: 7

## 2021-12-29 NOTE — CONSULTS
Inpatient [facility] Cardiology Consultation      Reason for Consult: Chest pain    Consulting Physician: Dr. Loleta Babinski    Requesting Physician: Dr. Guillermo Hoang    Date of Consultation: 12/29/2021    HISTORY OF PRESENT ILLNESS:   Patient is a 59year old WM known to Dr. Roxanne San. Patient is being seen in consultation this hospital admission by Dr. Loleta Babinski for evaluation and recommendations regarding chest discomfort. Patient has a known past medical history of hypertension, hyperlipidemia, carotid artery disease, current tobacco abuse, peripheral vascular disease on Pletal therapy and strong family history of early onset coronary artery disease. Patient presented to [facility] on December 28, 2021 for elective left carotid endarterectomy with bovine patch angioplasty by Dr. Cassi Venegas. He underwent the surgery yesterday, 12/28 and remained stable post-operatively. However, this morning patient noted of an episode of chest discomfort at rest.  He states he had an episode of chest discomfort similar to this approximately six months ago without recurrence. At that time, the chest discomfort occurred while he was working out in his garage. He did not seek medical evaluation at that time. The chest discomfort that occurred this morning was noted at rest.  It was located on the right side of his chest with radiation to his right arm. He denies any paresthesias. He described it as an aching sensation. It lasted approximately 30 minutes in total duration with spontaneous resolution. He has had no recurrence of the chest discomfort since then. He does admit to associated shortness of breath, nausea and mild diaphoresis with the chest discomfort episode. He denies any complaints of emesis, palpitations, dizziness, near-syncope or syncope. He denies paroxysmal nocturnal dyspnea, orthopnea or peripheral edema. Family and social history as noted below. Labs and diagnostic testing as noted below.       Please note: past medical records were reviewed per electronic medical record (EMR) - see detailed reports under Past Medical/ Surgical History. PAST MEDICAL HISTORY:    · Hypertension. · Hyperlipidemia, on statin therapy. · Carotid artery disease. · Current tobacco abuse. · Peripheral vascular disease. On Pletal therapy. · Strong family history of early onset CAD. CARDIOVASCULAR TESTING    GAVIOTA ([facility], 12/2019)  IMPRESSION:   1. Abnormal, monophasic waveforms throughout bilateral lower extremities.    Considerable aortoiliac disease. 2. Scattered atheromatous plaques demonstrated within bilateral lower   extremities. Lexiscan Stress Test (1/2020)  Impression  Moderate-sized partially reversible perfusion defect in the inferior  wall. The finding may be due to attenuation artifact. No significant wall motion abnormality. Estimated left ventricular ejection fraction is 57%. Neck CTA (10/2021)  Impression:  1. Irregular calcified plaque about both carotid bifurcations and proximal  ICAs.  Some ulceration is seen about the proximal right ICA but there is no  evidence of hemodynamically significant stenosis of the right ICA by NASCET  criteria. 2. Severe 80% stenosis of the proximal to mid left ICA, about 2 cm from its  origin. 3. Irregular calcified plaque about both intra cavernous internal carotid  arteries causing stenosis about 70% on the right and 50% on the left. 4. Bilateral patent vertebral arteries.  The left vertebral artery is  dominant without significant stenosis. Aym Pleas is about 80% focal stenosis at  the origin of the right vertebral artery. 5. 1 cm left lower pole thyroid nodule.  No follow-up imaging is recommended. GAVIOTA (10/2021)  Impression:  Diffusely abnormal Doppler and PVR waveforms with ankle brachial indices  indicating moderate arterial stenosis bilaterally.  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       PAST SURGICAL HISTORY:    Past Surgical History: Procedure Laterality Date    CAROTID ENDARTERECTOMY Left 12/28/2021    LEFT CAROTID ENDARTERECTOMY performed by Nikita Yeager MD at [facility]:  Prior to Admission medications    Medication Sig Start Date End Date Taking? Authorizing Provider   amLODIPine (NORVASC) 5 MG tablet Take 10 mg by mouth daily    Yes Historical Provider, MD   metoprolol succinate (TOPROL XL) 25 MG extended release tablet Take 25 mg by mouth daily   Yes Historical Provider, MD   rosuvastatin (CRESTOR) 10 MG tablet Take 10 mg by mouth daily   Yes Historical Provider, MD   aspirin 81 MG EC tablet Take 81 mg by mouth daily   Yes Historical Provider, MD   cilostazol (PLETAL) 100 MG tablet Take 1 tablet by mouth 2 times daily  Patient taking differently: Take 100 mg by mouth 2 times daily Hold the morning of surgery.  9/9/21 12/17/21 Yes Miranda Orta MD       CURRENT MEDICATIONS:      Current Facility-Administered Medications:     aspirin EC tablet 81 mg, 81 mg, Oral, Daily, Aury Pace MD    amLODIPine (NORVASC) tablet 10 mg, 10 mg, Oral, Daily, Aury Pace MD    cilostazol (PLETAL) tablet 100 mg, 100 mg, Oral, BID, Aury Pace MD    metoprolol succinate (TOPROL XL) extended release tablet 25 mg, 25 mg, Oral, Daily, Aury Pace MD    rosuvastatin (CRESTOR) tablet 10 mg, 10 mg, Oral, Daily, Aury Pace MD    sodium chloride flush 0.9 % injection 5-40 mL, 5-40 mL, IntraVENous, 2 times per day, Aury Pace MD, 10 mL at 12/28/21 2116    sodium chloride flush 0.9 % injection 5-40 mL, 5-40 mL, IntraVENous, PRN, Aury Pace MD    0.9 % sodium chloride infusion, 25 mL, IntraVENous, PRN, Aury Pace MD, Last Rate: 75 mL/hr at 12/29/21 0000, Rate Verify at 12/29/21 0000    ondansetron (ZOFRAN-ODT) disintegrating tablet 4 mg, 4 mg, Oral, Q8H PRN **OR** ondansetron (ZOFRAN) injection 4 mg, 4 mg, IntraVENous, Q6H PRN, Aury Pace MD, 4 mg at 12/29/21 0659    acetaminophen (TYLENOL) tablet 650 mg, 650 mg, Oral, Q4H While awake, Santiago Frank MD, 650 mg at 12/29/21 0549    oxyCODONE (ROXICODONE) immediate release tablet 5 mg, 5 mg, Oral, Q4H PRN **OR** oxyCODONE HCl (OXY-IR) immediate release tablet 10 mg, 10 mg, Oral, Q4H PRN, Santiago Frank MD, 10 mg at 12/29/21 0548    HYDROmorphone (DILAUDID) injection 0.5 mg, 0.5 mg, IntraVENous, Q4H PRN, Santiago Frank MD    labetalol (NORMODYNE;TRANDATE) injection 10 mg, 10 mg, IntraVENous, Q1H PRN, Santiago Frank MD, 10 mg at 12/29/21 0701    hydrALAZINE (APRESOLINE) injection 10 mg, 10 mg, IntraVENous, Q1H PRN, Santiago Frank MD, 10 mg at 12/29/21 0605      ALLERGIES:  Patient has no known allergies. SOCIAL HISTORY:    Current tobacco abuse, has smoked approximately one pack/day for 40+ years. As of recently, patient has cut down to half a pack per day however. Denies former or current alcohol or illicit drug use. FAMILY HISTORY:   Mother with history of MI at age 54 requiring PCI. His sister with history of CAD and MI in her 46s requiring PCI and stent placement. Brother with history of CAD and MI at age 52 requiring PCI and stent placement. Denies any other pertinent cardiac family medical history to me at this time. REVIEW OF SYSTEMS:     · Constitutional: Denies fevers, chills, night sweats, and generalized fatigue. · HEENT: Denies headaches, nose bleeds, rhinorrhea, sore throat. Denies blurred vision. Denies dysphagia, odynophagia. · Musculoskeletal: Denies falls, pain to BLE with ambulation. Denies muscle weakness. · Neurological: Denies dizziness or lightheadedness, numbness and tingling. Denies focal neurological deficits. · Cardiovascular: +chest pain, +diaphoresis. Denies palpitations, near syncope, syncope. Denies PND, orthopnea, peripheral edema. · Respiratory: +SOB. Denies cough, hemoptysis. · Gastrointestinal: +nausea. Denies abdominal pain, vomiting, diarrhea and constipation, black/bloody, and tarry stools.    · Genitourinary: Denies dysuria and hematuria. · Hematologic: Denies excessive bruising or bleeding. · Endocrine: Denies excessive thirst. Denies intolerance to hot and cold. PHYSICAL EXAM:   BP (!) 148/92   Pulse 98   Temp 98.2 °F (36.8 °C) (Temporal)   Resp 16   Ht 5' 7\" (1.702 m)   Wt 166 lb (75.3 kg)   SpO2 92%   BMI 26.00 kg/m²   CONST:  Well developed, well nourished WM who appears stated age. Awake, alert, cooperative, no apparent distress. HEENT:   Head- Normocephalic, atraumatic. Eyes- Conjunctivae pink, anicteric. Neck-  No stridor, trachea midline, no apparent jugular venous distention. CHEST: Chest symmetrical and non-tender to palpation. No accessory muscle use or intercostal retractions. RESPIRATORY: Lung sounds - clear throughout fields. CARDIOVASCULAR:     No noted carotid bruit. Heart Ausculation- Regular rate and rhythm, no apparent murmur. PV: No lower extremity edema. No clubbing or cyanosis. ABDOMEN: Soft, non-tender to light palpation. Bowel sounds present. MS: Good muscle strength and tone. No atrophy or abnormal movements. SKIN: Warm and dry. NEURO / PSYCH: Oriented to person, place and time. Speech clear and appropriate. Follows all commands. Pleasant affect. DATA:    Telemetry: NSR with HR in the 90s. Frequent PVCs. Diagnostic:  All diagnostic testing and lab work thus far this admission reviewed in detail.     12/29/21 1015     Sodium 132 - 146 mmol/L 139    Potassium 3.5 - 5.0 mmol/L 3.2 Low     Chloride 98 - 107 mmol/L 103    CO2 22 - 29 mmol/L 21 Low     Anion Gap 7 - 16 mmol/L 15    Glucose 74 - 99 mg/dL 180 High     BUN 6 - 23 mg/dL 12    CREATININE 0.7 - 1.2 mg/dL 0.6 Low     GFR Non- >=60 mL/min/1.73 >60       Ref Range & Units 12/29/21 1015   Troponin, High Sensitivity 0 - 11 ng/L 12 High       12/29/21 1015     WBC 4.5 - 11.5 E9/L 21.5 High     RBC 3.80 - 5.80 E12/L 4.72    Hemoglobin 12.5 - 16.5 g/dL 14.4    Hematocrit 37.0 - 54.0 % 43.2 MCV 80.0 - 99.9 fL 91.5    MCH 26.0 - 35.0 pg 30.5    MCHC 32.0 - 34.5 % 33.3    RDW 11.5 - 15.0 fL 13.4    Platelets 029 - 338 G4/C 352    MPV 7.0 - 12.0 fL 9.1          Intake/Output Summary (Last 24 hours) at 12/29/2021 0757  Last data filed at 12/29/2021 0700  Gross per 24 hour   Intake 2442 ml   Output 1810 ml   Net 632 ml       ASSESSMENT:  · Typical anginal chest pain episode during postoperative period of hypertension with some ST depressions on EKG. Initial hs-Antoine and second was 2:14 hours  · Carotid artery disease. S/p left carotid endarterectomy with bovine patch angioplasty by Dr. Zheng Oreilly 12/28. · Hypertension, uncontrolled at times. · Hyperlipidemia, on statin therapy. · Peripheral vascular disease. On Pletal therapy. · Current tobacco abuse. · Strong family history of early onset CAD. RECOMMENDATIONS:  · Consider titration of Toprol-XL therapy in setting of frequent PVCs and uncontrolled hypertension. · Increase Crestor to 40 mg daily. · Continue with ASA and statin therapy. · Further ischemic evaluation pending result of repeat troponin (Coronary CTA if no MI versus left heart catheterization if he rules in for MI). · Obtain opinion from Vascular surgery in regard to possible need for DAPT initiation in the post-operative setting if left heart catheterization is recommended. · Further recommendations to follow as per Dr. Sravani Stoddard. The above case and recommendations have been discussed and made in collaboration with Dr. Sravani Stoddard. NOTE: This report was transcribed using voice recognition software. Every effort was made to ensure accuracy; however, inadvertent computerized transcription errors may be present.       Leah Olivia, [address] [place] [address], [place] [address] Cardiology    Electronically signed by Lorene Edgar PA-C on 12/29/2021 at 7:57 AM       PHYSICIAN ADDENDUM:  I independently reviewed the HPI, ROS, PMH, PSH, [address], [place], and medications independently and agree with above documentation. Plan discussed with the patient/family/care team.    · Patient left the hospital [address] before I was able to evaluate him in person. As noted above, recommend ischemic evaluation either with a coronary CTA or coronary angiogram given his high burden of peripheral and carotid disease, active smoking, typical anginal chest pain. He will hopefully follow-up on this in the outpatient setting.       Chantel Gonzalez MD, [facility]  Interventional Cardiology/Structural Heart Disease  Office: [phone]  Coordinator: Faina Gonzalez

## 2021-12-29 NOTE — PROGRESS NOTES
CVICU Progress Note    Name: Jarod Burkett  MRN: [id number]    CC: Postoperative Critical Care Management     Indication for Surgery/Procedure: asymptomatic left internal carotid artery stenosis      Important/Relevant PMH/PSH: HTN, HLD, current smoker      Procedure/Surgeries: 12/28/2021  Left carotid endarterectomy with bovine patch angioplasty         Intake/Output Summary (Last 24 hours) at 12/29/2021 0751  Last data filed at 12/29/2021 0700  Gross per 24 hour   Intake 2442 ml   Output 1810 ml   Net 632 ml       No results for input(s): WBC, HGB, HCT, PLT in the last 72 hours. Lab Results   Component Value Date     12/22/2021    K 3.6 12/22/2021     12/22/2021    CO2 25 12/22/2021    BUN 7 12/22/2021    CREATININE 0.7 12/22/2021    GLUCOSE 111 12/22/2021    CALCIUM 9.6 12/22/2021         Physical Exam:    BP (!) 174/89   Pulse 110   Temp 98.7 °F (37.1 °C) (Temporal)   Resp 21   Ht 5' 7\" (1.702 m)   Wt 166 lb (75.3 kg)   SpO2 93%   BMI 26.00 kg/m²       General: Awake, alert. Denies SOB, or chest pain, is having some \"indigestion\" still   Eyes: PERRL, anicteric   Pulmonary: Diminished bibasilar. No wheezes, no accessory muscle use noted   Cardiovascular:  RRR, no heaves or thrills on palpation  Tele: SR/ST  Abdomen: Soft, nontender, + BS   Extremities: Palpable pulses all extremities, no edema   Neurologic/Psych: A&Ox3, TRAN to command, equal in strength bilaterally, tongue midline   Skin: Warm and dry  Incisions: left neck incision soft well approximated       Assessment/Plan: POD #1  1.  S/p Left carotid endarterectomy with bovine patch angioplasty   - episode of chest pain this morning, now resolved--reports it was after he received IV blood pressure medication, still having \"indigestion still\"-- cardiology has been consulted  -advance diet  -remove johnston and art line  -discharge pending cardiology workup      Electronically signed by JES Corrales - CNP on 12/29/2021 at 7:51 AM

## 2021-12-29 NOTE — PROGRESS NOTES
Perfect serve sent to Dr. Marlin Issa for cardiology consult requested by Dr. Ashutosh Waterman. Lee Ann serve redirected to Yue HONEYCUTT. Perfect serve read, awaiting response.

## 2021-12-29 NOTE — PROGRESS NOTES
Called to patients room, pt wanting to sign out AMA, disscussed in length with the patient that since he was having chest pain this morning,coupled with elevation in his troponin level that there is concern  that patient could have a heart attack. I strongly encouraged him to stay so he could be monitored closely and so cardiology could finish their workup. He has agreed to stay for now if cardiology comes to speak with him. Bedside RN to contact cardiology service.

## 2021-12-29 NOTE — TELEPHONE ENCOUNTER
Patients daughter called and said that you saw her father today and said he could go home. Daughter states after that someone came in to see her father and said troponin level was elevated that he needed to stay one more night to make sure nothing wrong with heart. Patient signed out AMA.

## 2021-12-29 NOTE — PROGRESS NOTES
Pt voicing that he does not want to stay against the advice of several medical professionals. The patient's daughter is bedside. The patient and his daughter were told his rights as a patient. The patient and daughter were educated on reasoning for not advising discharge today. Clara Patel NP notified and came bedside to talk with patient and daughter. Dr. Giulia Bunch was called to update on patient's wishes, awaiting response.

## 2021-12-29 NOTE — PROGRESS NOTES
Pt is s/p left cea.    Discussed with Dr Kaushik Franco is ok for anticoagulation if needed by cardiology

## 2021-12-29 NOTE — PLAN OF CARE
Problem: Discharge Planning:  Goal: Participates in care planning  12/29/2021 0031 by Adrienne Esquivel RN  Outcome: Met This Shift  12/28/2021 1841 by Amanda Baer RN  Outcome: Ongoing  12/28/2021 1824 by Joao Meyer RN  Outcome: Met This Shift  12/28/2021 1758 by Amanda Baer RN  Outcome: Ongoing     Problem:  Bowel Function - Altered:  Goal: Bowel elimination is within specified parameters  12/29/2021 0031 by Adrienne Esquivel RN  Outcome: Met This Shift  12/28/2021 1841 by Amanda Baer RN  Outcome: Ongoing  12/28/2021 1824 by Joao Meyer RN  Outcome: Met This Shift  12/28/2021 1758 by Amanda Baer RN  Outcome: Ongoing     Problem: Cardiac Output - Decreased:  Goal: Hemodynamic stability will improve  12/29/2021 0031 by Adrienne Esquivel RN  Outcome: Met This Shift  12/28/2021 1841 by Amanda Baer RN  Outcome: Ongoing  12/28/2021 1824 by Joao Meyer RN  Outcome: Met This Shift  12/28/2021 1758 by Amanda Baer RN  Outcome: Ongoing     Problem: Nutrition Deficit:  Goal: Ability to achieve adequate nutritional intake will improve  12/29/2021 0031 by Adrienne Esquivel RN  Outcome: Met This Shift  12/28/2021 1841 by Amanda Baer RN  Outcome: Ongoing  12/28/2021 1824 by Joao Meyer RN  Outcome: Met This Shift  12/28/2021 1758 by Amanda Baer RN  Outcome: Ongoing     Problem: Pain:  Goal: Pain level will decrease  12/29/2021 0031 by Adrienne Esquivel RN  Outcome: Met This Shift  12/28/2021 1841 by Amanda Baer RN  Outcome: Ongoing  12/28/2021 1824 by Joao Meyer RN  Outcome: Met This Shift  12/28/2021 1758 by Amanda Baer RN  Outcome: Ongoing  Goal: Recognizes and communicates pain  12/29/2021 0031 by Adrienne Esquivel RN  Outcome: Met This Shift  12/28/2021 1841 by Amanda Baer RN  Outcome: Ongoing  12/28/2021 1824 by Joao Savin, RN  Outcome: Met This Shift  12/28/2021 1758 by Amanda Baer RN  Outcome: Ongoing  Goal: Control of acute pain  12/29/2021 0031 by Chano Ram RN  Outcome: Met This Shift  12/28/2021 1841 by Lev Florez RN  Outcome: Ongoing  12/28/2021 1824 by Aleida Puentes RN  Outcome: Met This Shift  12/28/2021 1758 by Lev Florez RN  Outcome: Ongoing     Problem: Skin Integrity - Impaired:  Goal: Will show no infection signs and symptoms  12/29/2021 0031 by Chano Ram, RN  Outcome: Met This Shift  12/28/2021 1841 by Lev Florez, RN  Outcome: Ongoing  12/28/2021 1824 by Aleida Puentes RN  Outcome: Met This Shift  12/28/2021 1758 by Lev Florez RN  Outcome: Ongoing  Goal: Absence of new skin breakdown  12/29/2021 0031 by Chano Ram RN  Outcome: Met This Shift  12/28/2021 1841 by Lev Florez RN  Outcome: Ongoing  12/28/2021 1824 by Aleida Puentes RN  Outcome: Met This Shift  12/28/2021 1758 by Lev Florez RN  Outcome: Ongoing     Problem: Sleep Pattern Disturbance:  Goal: Appears well-rested  12/29/2021 0031 by Chano Ram, RN  Outcome: Met This Shift  12/28/2021 1841 by Lev Florez RN  Outcome: Ongoing  12/28/2021 1824 by Aleida Puentes RN  Outcome: Met This Shift  12/28/2021 1758 by Lev Florez RN  Outcome: Ongoing     Problem: Tissue Perfusion, Altered:  Goal: Circulatory function within specified parameters  12/29/2021 0031 by Chano Ram, RN  Outcome: Met This Shift  12/28/2021 1841 by Lev Florez RN  Outcome: Ongoing  12/28/2021 1824 by Aleida Puentes RN  Outcome: Met This Shift  12/28/2021 1758 by Lev Florez RN  Outcome: Ongoing     Problem: Pain:  Goal: Pain level will decrease  12/29/2021 0031 by Chano Ram, RN  Outcome: Met This Shift  12/28/2021 1841 by Lev Florez RN  Outcome: Ongoing  12/28/2021 1824 by Aleida Puentes RN  Outcome: Met This Shift  12/28/2021 1758 by Parthenia Scot, RN  Outcome: Ongoing  Goal: Control of acute pain  12/29/2021 0031 by Chano Ram RN  Outcome: Met This Shift  12/28/2021 1841 by Claude Neptune TOMMIE Carrington  Outcome: Ongoing  12/28/2021 1824 by Anna Trevizo RN  Outcome: Met This Shift  12/28/2021 1758 by Parrish Ruiz RN  Outcome: Ongoing     Problem: Falls - Risk of:  Goal: Will remain free from falls  12/29/2021 0031 by Breanna Bolanos RN  Outcome: Met This Shift  12/28/2021 1841 by Parrish Ruiz RN  Outcome: Ongoing  Goal: Absence of physical injury  12/29/2021 0031 by Breanna Bolanos RN  Outcome: Met This Shift  12/28/2021 1841 by Parrish Ruiz RN  Outcome: Ongoing     Problem: Discharge Planning:  Goal: Discharged to appropriate level of care  12/29/2021 0031 by Breanna Bolanos RN  Outcome: Ongoing  12/28/2021 1841 by Parrish Ruiz RN  Outcome: Ongoing  12/28/2021 1824 by Anna Trevizo RN  Outcome: Met This Shift  12/28/2021 1758 by Parrish Ruiz RN  Outcome: Ongoing     Problem: Airway Clearance - Ineffective:  Goal: Ability to maintain a clear airway will improve  12/29/2021 0031 by Breanna Bolanos RN  Outcome: Completed  12/28/2021 1841 by Parrish Ruiz RN  Outcome: Ongoing  12/28/2021 1824 by Anna Trevizo RN  Outcome: Met This Shift  12/28/2021 1758 by Parrish Ruiz RN  Outcome: Ongoing     Problem: Anxiety/Stress:  Goal: Level of anxiety will decrease  12/29/2021 0031 by Breanna Bolanos RN  Outcome: Completed  12/28/2021 1841 by Parrish Ruiz RN  Outcome: Ongoing  12/28/2021 1824 by Anna Trevizo RN  Outcome: Met This Shift  12/28/2021 1758 by Parrish Ruiz RN  Outcome: Ongoing     Problem: Aspiration:  Goal: Absence of aspiration  12/29/2021 0031 by Breanna Bolanos RN  Outcome: Completed  12/28/2021 1841 by Parrish Ruiz RN  Outcome: Ongoing  12/28/2021 1824 by Anna Trevizo RN  Outcome: Met This Shift  12/28/2021 1758 by Parrish Ruiz RN  Outcome: Ongoing     Problem: Fluid Volume - Imbalance:  Goal: Absence of imbalanced fluid volume signs and symptoms  12/29/2021 0031 by Breanna Bolanos RN  Outcome: Completed  12/28/2021 1841 by Bing Mejia TOMMIE Carrington  Outcome: Ongoing  12/28/2021 1824 by Anuel Elena RN  Outcome: Met This Shift  12/28/2021 1758 by Sarai Riddle RN  Outcome: Ongoing     Problem: Gas Exchange - Impaired:  Goal: Levels of oxygenation will improve  12/29/2021 0031 by Rose Marie España RN  Outcome: Completed  12/28/2021 1841 by Sarai Riddle RN  Outcome: Ongoing  12/28/2021 1824 by Anuel Elena RN  Outcome: Met This Shift  12/28/2021 1758 by Sarai Riddle RN  Outcome: Ongoing     Problem: Mental Status - Impaired:  Goal: Mental status will be restored to baseline  12/29/2021 0031 by Rose Marie España RN  Outcome: Completed  12/28/2021 1841 by Sarai Riddle RN  Outcome: Ongoing  12/28/2021 1824 by Anuel Elena RN  Outcome: Met This Shift  12/28/2021 1758 by Sarai Riddle RN  Outcome: Ongoing     Problem: Pain:  Goal: Control of chronic pain  12/29/2021 0031 by Rose Marie España RN  Outcome: Completed  12/28/2021 1841 by Sarai Riddle RN  Outcome: Ongoing  12/28/2021 1824 by Anuel Elena RN  Outcome: Met This Shift  12/28/2021 1758 by Sarai Riddle RN  Outcome: Ongoing     Problem: Serum Glucose Level - Abnormal:  Goal: Ability to maintain appropriate glucose levels will improve to within specified parameters  12/29/2021 0031 by Rose Marie España RN  Outcome: Completed  12/28/2021 1841 by Sarai Riddle RN  Outcome: Ongoing  12/28/2021 1758 by Sarai Riddle RN  Outcome: Ongoing     Problem: Tissue Perfusion - Cardiopulmonary, Altered:  Goal: Absence of angina  12/29/2021 0031 by Rose Marie España RN  Outcome: Completed  12/28/2021 1841 by Sarai Riddle RN  Outcome: Ongoing  12/28/2021 1824 by Anuel Elena RN  Outcome: Met This Shift  12/28/2021 1758 by Sarai Riddle RN  Outcome: Ongoing  Goal: Hemodynamic stability will improve  12/29/2021 0031 by Rose Marie España RN  Outcome: Completed  12/28/2021 1841 by Sarai Riddle RN  Outcome: Ongoing  12/28/2021 1824 by Anuel Elena RN  Outcome: Met This Shift  12/28/2021 1758 by Brenda Christine RN  Outcome: Ongoing     Problem: Pain:  Goal: Control of chronic pain  12/29/2021 0031 by Danish Nava RN  Outcome: Completed  12/28/2021 1841 by Brenda Christine RN  Outcome: Ongoing  12/28/2021 1824 by Yumiko Kirkland RN  Outcome: Met This Shift  12/28/2021 1758 by Brenda Christine RN  Outcome: Ongoing

## 2021-12-29 NOTE — CARE COORDINATION
Pod #1 s/p L cea. Met with pt. Pt is independent in adl's. His grandson lives with him. Pcp is Dr Adali Jaramillo, preferred pharmacy is [facility] [address] [facility] today pending cardiology eval. Offered [facility]. He is agreeable and has no preference of co. Referral made to Tory Da Silva at [facility] [place]. Pt has been accepted. Pt states his daughter will transport him home.

## 2021-12-29 NOTE — PROGRESS NOTES
nnamdi Tim DO    Patient was seen and examined. Agree with above. He is having some intermittent chest pain and some acid reflux. Will have cardiology evaluate him. He has no clinical deficits. His incision is clean dry and intact. Family is at the bedside. Assessment and plan. #1 status post left carotid endarterectomy.   At this point he is doing okay we will see what cardiology says if they are okay with him being discharged she can be discharged today and follow-up in 2 weeks    Merrill

## 2021-12-29 NOTE — PROGRESS NOTES
Patient left hospital AMA. AMA paperwork signed. PIVs and art lines removed. Dr. Yony Almeida notified  Dr. Felicia Rivera notfied.

## 2021-12-30 NOTE — TELEPHONE ENCOUNTER
Tell her I think that was a bad decision on his part. I will see him in the office as scheduled.   If has any chest pain or shortness of breath I suggest that he return to the emergency room or call the cardiologist.

## 2022-01-04 NOTE — DISCHARGE SUMMARY
Vascular Surgery Discharge Summary    [address] [place] [address] SUMMARY:                The patient is a 59 y.o. male who was admitted to the hospital on 12/28/2021  5:23 AM for treatment of critical left carotid artery stenosis. On the day of admission, a left carotid endarterectomy was performed. The patient's hospital course was uncomplicated and consisted of physical therapy, incision observation, and a return to normal oral intake. The patient was discharged on 12/29/2021  3:42 PM tolerating a diet, moving bowels, and urinating without difficulty. The incisions were clean and intact. The patient was discharged to  in satisfactory condition with instructions to call the office for a follow up appointment. Unfortunately the patient had chest pain and decided to sign out AMA. Hospital Problem List:  Active Problems:    Bilateral carotid artery stenosis    Carotid stenosis, left  Resolved Problems:    * No resolved hospital problems. *     Procedure(s) (LRB):  LEFT CAROTID ENDARTERECTOMY (Left)    Discharge Medications:      Medication List      ASK your doctor about these medications    amLODIPine 5 MG tablet  Commonly known as: NORVASC     aspirin 81 MG EC tablet     cilostazol 100 MG tablet  Commonly known as: PLETAL  Take 1 tablet by mouth 2 times daily     Crestor 10 MG tablet  Generic drug: rosuvastatin     metoprolol succinate 25 MG extended release tablet  Commonly known as: TOPROL XL        Medication Instructions:    Carotid Stenosis and Carotid Endarterectomy  Care After Surgery     Refer to this sheet in the next few weeks. These discharge instructions provide you with general information on caring for yourself after you leave the hospital. Your caregiver may also give you specific instructions. Your treatment has been planned according to the most current medical practices available, but unavoidable complications sometimes occur.  If you have any problems or questions after discharge, please call your caregiver. HOME CARE INSTRUCTIONS  Ø It is normal to be sore for a couple weeks after surgery. See your caregiver if this seems to be getting worse rather than better. Ø Take showers, not baths, for a few days after surgery or until instructed otherwise by your caregiver. Do not take baths or swim until directed by your surgeon. Ø Only take over-the-counter or prescription medicines for pain, discomfort, or fever as directed by your caregiver. Ø A blood thinner (anticoagulant) may be prescribed after surgery. This medicine should be taken exactly as directed. Ø Change bandages (dressings) as directed by your caregiver. Ø Resume your normal activities as directed by your caregiver. Ø Avoid lifting until you are instructed otherwise. Ø Make an appointment to see your caregiver for stitches (suture) or staple removal when instructed. Ø Stop smoking if you smoke. This is a grave risk factor. Ø Stop taking the pill (oral contraceptives) unless your caregiver recommends otherwise. Ø Maintain good blood pressure control. Ø Exercise regularly or as instructed. Ø Lower blood lipids (cholesterol and triglycerides). Ø Eat a heart-healthy diet. Manage heart problems if they are contributing to your risk. SEEK MEDICAL CARE IF:  Ø There is increased bleeding from the wound. Ø You notice redness, swelling, or increasing pain in the wound. Ø You notice swelling in your neck or have difficulty breathing or talking. Ø You notice a bad smell or pus coming from the wound or dressing. Ø You have an oral temperature above 102º F (38.9º C). SEEK IMMEDIATE MEDICAL CARE IF:  Ø Your initial symptoms are getting worse rather than better. Ø You develop any abnormal bruising or bleeding. Ø You develop a rash. Ø You have difficulty breathing. Ø You develop any reaction or side effects to medicine given.   Ø You develop chest pain, shortness of breath, or pain or swelling in your legs.  Ø You have a return of symptoms or problems that caused you to have this surgery. Ø You develop a temporary loss of vision. Ø You develop temporary numbness on one side. Ø You develop a temporary inability to speak (aphasia). Ø You develop temporary areas of weakness. Ø You have problems or concerns that have not been answered. MAKE SURE YOU:  Ø Understand these instructions. Ø Will watch your condition. Ø Will get help right away if you are not doing well or get worse. Document Released: 01/01/2000  Document Re-Released: 03/14/2011  ExitCare® Patient Information ©2011 [employer]. Other Instructions:    No driving for 2 weeks. OK to shower. Wash incision daily with soap and water. FOLLOW-UP CARE:  [x]Call the office at [phone] for follow-up appointment in 2 weeks.              Rusty Pope MD  1/4/2022

## 2022-01-04 NOTE — ANESTHESIA POSTPROCEDURE EVALUATION
Department of Anesthesiology  Postprocedure Note    Patient: Jeniffer Hernandez  MRN: [id number]  YOB: 1960  Date of evaluation: 1/4/2022  Time:  8:27 AM     Procedure Summary     Date: 12/28/21 Room / Location: [address] [place] [address] / [facility]    Anesthesia Start: 5695 Anesthesia Stop: 5363    Procedure: LEFT CAROTID ENDARTERECTOMY (Left ) Diagnosis: (CAD)    Surgeons: Eduardo Young MD Responsible Provider: Omer Romeo MD    Anesthesia Type: general ASA Status: 4          Anesthesia Type: general    Mere Phase I: Mere Score: 8    Mere Phase II:      Last vitals: Reviewed and per EMR flowsheets.        Anesthesia Post Evaluation    Patient location during evaluation: PACU  Patient participation: complete - patient participated  Level of consciousness: awake and alert  Airway patency: patent  Nausea & Vomiting: no nausea and no vomiting  Complications: no  Cardiovascular status: hemodynamically stable and blood pressure returned to baseline  Respiratory status: acceptable  Hydration status: euvolemic

## 2022-01-05 ENCOUNTER — OFFICE VISIT (OUTPATIENT)
Dept: CARDIOLOGY CLINIC | Age: 62
End: 2022-01-05
Payer: COMMERCIAL

## 2022-01-05 VITALS
RESPIRATION RATE: 16 BRPM | BODY MASS INDEX: 25.36 KG/M2 | HEART RATE: 107 BPM | DIASTOLIC BLOOD PRESSURE: 94 MMHG | HEIGHT: 67 IN | SYSTOLIC BLOOD PRESSURE: 191 MMHG | WEIGHT: 161.6 LBS

## 2022-01-05 DIAGNOSIS — R07.2 PRECORDIAL PAIN: Primary | ICD-10-CM

## 2022-01-05 DIAGNOSIS — E78.5 HYPERLIPIDEMIA, UNSPECIFIED HYPERLIPIDEMIA TYPE: ICD-10-CM

## 2022-01-05 PROBLEM — R07.9 CHEST PAIN: Status: ACTIVE | Noted: 2022-01-05

## 2022-01-05 PROBLEM — I65.23 BILATERAL CAROTID ARTERY STENOSIS: Status: RESOLVED | Noted: 2021-09-09 | Resolved: 2022-01-05

## 2022-01-05 PROBLEM — R09.89 BILATERAL CAROTID BRUITS: Status: RESOLVED | Noted: 2021-09-09 | Resolved: 2022-01-05

## 2022-01-05 PROCEDURE — 93000 ELECTROCARDIOGRAM COMPLETE: CPT | Performed by: INTERNAL MEDICINE

## 2022-01-05 PROCEDURE — 99215 OFFICE O/P EST HI 40 MIN: CPT | Performed by: INTERNAL MEDICINE

## 2022-01-05 RX ORDER — METOPROLOL SUCCINATE 50 MG/1
50 TABLET, EXTENDED RELEASE ORAL DAILY
Qty: 90 TABLET | Refills: 1 | Status: SHIPPED
Start: 2022-01-05 | End: 2022-03-10 | Stop reason: CLARIF

## 2022-01-05 RX ORDER — ROSUVASTATIN CALCIUM 20 MG/1
20 TABLET, COATED ORAL DAILY
Qty: 90 TABLET | Refills: 1 | Status: SHIPPED | OUTPATIENT
Start: 2022-01-05

## 2022-01-05 NOTE — PROGRESS NOTES
Chief Complaint   Patient presents with    Chest Pain    Hyperlipidemia       Patient Active Problem List    Diagnosis Date Noted    Chest pain 01/05/2022    HLD (hyperlipidemia) 01/05/2022    Carotid stenosis, left 12/28/2021    Intracranial carotid stenosis, bilateral 10/22/2021    PVD [facility]peripheral vascular disease) [facility] 09/09/2021    Tobacco dependence 09/09/2021       Current Outpatient Medications   Medication Sig Dispense Refill    amLODIPine (NORVASC) 10 MG tablet Take 10 mg by mouth daily       metoprolol succinate (TOPROL XL) 25 MG extended release tablet Take 25 mg by mouth daily      rosuvastatin (CRESTOR) 10 MG tablet Take 10 mg by mouth daily      aspirin 81 MG EC tablet Take 81 mg by mouth daily      cilostazol (PLETAL) 100 MG tablet Take 1 tablet by mouth 2 times daily (Patient taking differently: Take 100 mg by mouth 2 times daily Hold the morning of surgery. ) 60 tablet 11     No current facility-administered medications for this visit.         No Known Allergies    Vitals:    01/05/22 1011   BP: (!) 191/94   Pulse: 107   Resp: 16   Weight: 161 lb 9.6 oz (73.3 kg)   Height: 5' 7\" (1.702 m)       Social History     Socioeconomic History    Marital status:      Spouse name: Not on file    Number of children: Not on file    Years of education: Not on file    Highest education level: Not on file   Occupational History    Not on file   Tobacco Use    Smoking status: Current Every Day Smoker    Smokeless tobacco: Never Used   Substance and Sexual Activity    Alcohol use: Not Currently    Drug use: Never    Sexual activity: Not on file   Other Topics Concern    Not on file   Social History Narrative    Not on file     Social Determinants of Health     Financial Resource Strain:     Difficulty of Paying Living Expenses: Not on file   Food Insecurity:     Worried About Running Out of Food in the Last Year: Not on file    [address] in the Last Year: Not on file   Transportation Needs:     Lack of Transportation (Medical): Not on file    Lack of Transportation (Non-Medical): Not on file   Physical Activity:     Days of Exercise per Week: Not on file    Minutes of Exercise per Session: Not on file   Stress:     Feeling of Stress : Not on file   Social Connections:     Frequency of Communication with Friends and Family: Not on file    Frequency of Social Gatherings with Friends and Family: Not on file    Attends Congregation Services: Not on file    Active Member of [address] [place] [address] [employer] or Organizations: Not on file    Attends Club or Organization Meetings: Not on file    Marital Status: Not on file   Intimate Partner Violence:     Fear of Current or Ex-Partner: Not on file    Emotionally Abused: Not on file    Physically Abused: Not on file    Sexually Abused: Not on file   Housing Stability:     Unable to Pay for Housing in the Last Year: Not on file    Number of [place] in the Last Year: Not on file    Unstable Housing in the Last Year: Not on file       History reviewed. No pertinent family history. SUBJECTIVE: Apryl Kimball presents to the office today for hfu after CEA. I reviewed notes. Dr Reece Caruso was involved as the patient c/o RIGHT sided shoulder and arm pain at day 1 post op. NO ACS and no clear cut EKG changes. Further w/u recommended but patient left. He says he has chronic shoulder pain  He complains of no new or unstable cardiac symptoms,  and denies exertional chest pain, chest pressure/discomfort, claudication, dyspnea, exertional chest pressure/discomfort, fatigue, irregular heart beat, lower extremity edema, near-syncope, orthopnea, palpitations, paroxysmal nocturnal dyspnea, syncope and tachypnea. Works manufacturing and repairing tools, does other chores around the house. Review of Systems:   Heart: as above   Lungs: as above   Eyes: denies changes in vision or discharge. Ears: denies changes in hearing or pain. Nose: denies epistaxis or masses   Throat: denies sore throat or trouble swallowing. Neuro: denies numbness, tingling, tremors. Skin: denies rashes or itching. : denies hematuria, dysuria   GI: denies vomiting, diarrhea   Psych: denies mood changed, anxiety, depression. all others negative. Physical Exam   BP (!) 191/94   Pulse 107   Resp 16   Ht 5' 7\" (1.702 m)   Wt 161 lb 9.6 oz (73.3 kg)   BMI 25.31 kg/m²   Constitutional: Oriented to person, place, and time. No distress. Head: Normocephalic and atraumatic. Eyes: EOM are normal. Pupils are equal, round, and reactive to light. Neck: Normal range of motion. Neck supple. No hepatojugular reflux and no JVD present. LEFT Carotid bruit is  present. Cardiovascular: Normal rate, regular rhythm, normal heart sounds and intact distal pulses. Exam reveals no gallop and no friction rub. No murmur heard. Pulmonary/Chest: Effort normal and breath sounds normal. No respiratory distress. No wheezes. No rales. Abdominal: Soft. Bowel sounds are normal. No distension and no mass. No tenderness. No rebound and no guarding. Musculoskeletal: Normal range of motion. No edema and no tenderness. Neurological: Alert and oriented to person, place, and time. Skin: Skin is warm and dry. No rash noted. Not diaphoretic. No erythema. Psychiatric: Normal mood and affect.  Behavior is normal.     EKG:  Sinus tachycardia,  PVC noted, unifocal    ASSESSMENT AND PLAN:  Patient Active Problem List   Diagnosis    PVD (peripheral vascular disease) with claudication    Tobacco dependence    Intracranial carotid stenosis, bilateral    Carotid stenosis, left    Chest pain    HLD (hyperlipidemia)     Patient with vascular disease, and likely CAD (given abnormal stress in past)  However, it does not follow that he has angina  Symptoms now resolved  All reviewed with patient and family, including classic angina symptoms etc  Will raise rosuvastatin to 20 mg qd and toprol xl to 50 mg qd    OV two months    Marya Dakin, M.D  [facility]

## 2022-01-06 LAB
BLOOD BANK DISPENSE STATUS: NORMAL
BLOOD BANK PRODUCT CODE: NORMAL
BPU ID: NORMAL
DESCRIPTION BLOOD BANK: NORMAL

## 2022-01-12 ENCOUNTER — OFFICE VISIT (OUTPATIENT)
Dept: VASCULAR SURGERY | Age: 62
End: 2022-01-12

## 2022-01-12 VITALS
WEIGHT: 161 LBS | DIASTOLIC BLOOD PRESSURE: 76 MMHG | SYSTOLIC BLOOD PRESSURE: 144 MMHG | HEART RATE: 51 BPM | HEIGHT: 67 IN | BODY MASS INDEX: 25.27 KG/M2

## 2022-01-12 DIAGNOSIS — I65.22 CAROTID STENOSIS, LEFT: Primary | ICD-10-CM

## 2022-01-12 PROCEDURE — 99024 POSTOP FOLLOW-UP VISIT: CPT | Performed by: SURGERY

## 2022-01-12 NOTE — PROGRESS NOTES
Vascular Surgery Outpatient Progress Note      Chief Complaint   Patient presents with    Post-Op Check     po carotids       HISTORY OF PRESENT ILLNESS:                The patient is a 59 y.o. male who returns for follow-up evaluation of carotid endarterectomy. Right now he denies any right-sided left-sided numbness or vision changes. Has been to his cardiologist who change his medications. According to his report he did not have any myocardial infarction and otherwise is doing well denies any chest pain. He like to go back to work. He presents with his family    Past Medical History:        Diagnosis Date    Bilateral carotid artery stenosis 9/9/2021    Bilateral carotid bruits 9/9/2021    IVAN (cerebral atherosclerosis)     HTN (hypertension)     Hypercholesteremia     Intracranial carotid stenosis, bilateral 10/22/2021    Tobacco dependence 9/9/2021     Past Surgical History:        Procedure Laterality Date    CAROTID ENDARTERECTOMY Left 12/28/2021    LEFT CAROTID ENDARTERECTOMY performed by Kirit Ansari MD at [place] [address]       Current Medications:   Prior to Admission medications    Medication Sig Start Date End Date Taking?  Authorizing Provider   rosuvastatin (CRESTOR) 20 MG tablet Take 1 tablet by mouth daily 1/5/22  Yes Edilia Munson MD   metoprolol succinate (TOPROL XL) 50 MG extended release tablet Take 1 tablet by mouth daily 1/5/22  Yes Edilia Munson MD   amLODIPine (NORVASC) 10 MG tablet Take 10 mg by mouth daily    Yes Historical Provider, MD   metoprolol succinate (TOPROL XL) 25 MG extended release tablet Take 25 mg by mouth daily   Yes Historical Provider, MD   rosuvastatin (CRESTOR) 10 MG tablet Take 10 mg by mouth daily   Yes Historical Provider, MD   aspirin 81 MG EC tablet Take 81 mg by mouth daily   Yes Historical Provider, MD   cilostazol (PLETAL) 100 MG tablet Take 1 tablet by mouth 2 times daily  Patient taking differently: Take 100 mg by mouth 2 times daily Hold the morning of surgery. 9/9/21 1/5/22  Yany Carson MD     Allergies:  Patient has no known allergies. Social History     Socioeconomic History    Marital status:      Spouse name: Not on file    Number of children: Not on file    Years of education: Not on file    Highest education level: Not on file   Occupational History    Not on file   Tobacco Use    Smoking status: Current Every Day Smoker    Smokeless tobacco: Never Used   Vaping Use    Vaping Use: Never used   Substance and Sexual Activity    Alcohol use: Not Currently    Drug use: Never    Sexual activity: Not on file   Other Topics Concern    Not on file   Social History Narrative    Not on file     Social Determinants of Health     Financial Resource Strain:     Difficulty of Paying Living Expenses: Not on file   Food Insecurity:     Worried About [address] in the Last Year: Not on file    [employer] of Food in the Last Year: Not on file   Transportation Needs:     Lack of Transportation (Medical): Not on file    Lack of Transportation (Non-Medical):  Not on file   Physical Activity:     Days of Exercise per Week: Not on file    Minutes of Exercise per Session: Not on file   Stress:     Feeling of Stress : Not on file   Social Connections:     Frequency of Communication with Friends and Family: Not on file    Frequency of Social Gatherings with Friends and Family: Not on file    Attends Latter-day Services: Not on file    Active Member of Clubs or Organizations: Not on file    Attends Club or Organization Meetings: Not on file    Marital Status: Not on file   Intimate Partner Violence:     Fear of Current or Ex-Partner: Not on file    Emotionally Abused: Not on file    Physically Abused: Not on file    Sexually Abused: Not on file   Housing Stability:     Unable to Pay for Housing in the Last Year: Not on file    Number of [place] in the Last Year: Not on file    Unstable Housing in the Last Year: Not on file        History reviewed. No pertinent family history. REVIEW OF SYSTEMS:    Constitutional: Negative for activity change, appetite change, chills, diaphoresis, fatigue, fever and unexpected weight change. PHYSICAL EXAM:  Vitals:    01/12/22 1135   BP: (!) 144/76   Pulse: 51     General Appearance: alert and oriented to person, place and time, well developed and well- nourished, in no acute distress  Skin: warm and dry, no rash or erythema, incision is healing nicely surgical glue still in place  Head: normocephalic and atraumatic  Eyes: extraocular eye movements intact, conjunctivae normal  ENT: external ear and ear canal normal bilaterally, nose without deformity  Pulmonary/Chest: clear to auscultation bilaterally- no wheezes, rales or rhonchi, normal air movement, no respiratory distress  Cardiovascular: normal rate, regular rhythm, normal S1 and S2, no murmurs, no carotid bruits  Neurologic: no cranial nerve deficit, gait, coordination and speech normal  Extremities: Bilateral palpable brachial radial pulses. Problem List Items Addressed This Visit     Carotid stenosis, left - Primary          #1 carotid artery disease status post left carotid endarterectomy. At this point he is doing well we will repeat the carotid ultrasound examination in 6 months he will call for the results. He will follow-up with cardiology      No follow-ups on file.

## 2022-03-10 ENCOUNTER — OFFICE VISIT (OUTPATIENT)
Dept: CARDIOLOGY CLINIC | Age: 62
End: 2022-03-10
Payer: COMMERCIAL

## 2022-03-10 VITALS
DIASTOLIC BLOOD PRESSURE: 80 MMHG | HEART RATE: 83 BPM | HEIGHT: 67 IN | BODY MASS INDEX: 26.53 KG/M2 | WEIGHT: 169 LBS | SYSTOLIC BLOOD PRESSURE: 155 MMHG | RESPIRATION RATE: 18 BRPM

## 2022-03-10 DIAGNOSIS — F17.200 TOBACCO DEPENDENCE: ICD-10-CM

## 2022-03-10 DIAGNOSIS — E78.5 HYPERLIPIDEMIA, UNSPECIFIED HYPERLIPIDEMIA TYPE: Primary | ICD-10-CM

## 2022-03-10 PROBLEM — I65.22 CAROTID STENOSIS, LEFT: Status: RESOLVED | Noted: 2021-12-28 | Resolved: 2022-03-10

## 2022-03-10 PROCEDURE — 93000 ELECTROCARDIOGRAM COMPLETE: CPT | Performed by: INTERNAL MEDICINE

## 2022-03-10 PROCEDURE — 99213 OFFICE O/P EST LOW 20 MIN: CPT | Performed by: INTERNAL MEDICINE

## 2022-03-10 RX ORDER — METOPROLOL SUCCINATE 100 MG/1
100 TABLET, EXTENDED RELEASE ORAL DAILY
COMMUNITY

## 2022-03-10 NOTE — PROGRESS NOTES
Chief Complaint   Patient presents with    Heart Problem       Patient Active Problem List    Diagnosis Date Noted    Chest pain 01/05/2022    HLD (hyperlipidemia) 01/05/2022    Intracranial carotid stenosis, bilateral 10/22/2021    P[facility]peripheral vascular disease) [facility] 09/09/2021    Tobacco dependence 09/09/2021       Current Outpatient Medications   Medication Sig Dispense Refill    metoprolol succinate (TOPROL XL) 100 MG extended release tablet Take 100 mg by mouth daily      rosuvastatin (CRESTOR) 20 MG tablet Take 1 tablet by mouth daily 90 tablet 1    amLODIPine (NORVASC) 10 MG tablet Take 10 mg by mouth daily       aspirin 81 MG EC tablet Take 81 mg by mouth daily      cilostazol (PLETAL) 100 MG tablet Take 1 tablet by mouth 2 times daily (Patient taking differently: Take 100 mg by mouth 2 times daily Hold the morning of surgery. ) 60 tablet 11     No current facility-administered medications for this visit.         No Known Allergies    Vitals:    03/10/22 0733   BP: (!) 155/80   Pulse: 83   Resp: 18   Weight: 169 lb (76.7 kg)   Height: 5' 7\" (1.702 m)       Social History     Socioeconomic History    Marital status:      Spouse name: Not on file    Number of children: Not on file    Years of education: Not on file    Highest education level: Not on file   Occupational History    Not on file   Tobacco Use    Smoking status: Current Every Day Smoker    Smokeless tobacco: Never Used   Vaping Use    Vaping Use: Never used   Substance and Sexual Activity    Alcohol use: Not Currently    Drug use: Never    Sexual activity: Not on file   Other Topics Concern    Not on file   Social History Narrative    Not on file     Social Determinants of Health     Financial Resource Strain:     Difficulty of Paying Living Expenses: Not on file   Food Insecurity:     Worried About Running Out of Food in the Last Year: Not on file    Alexa of Food in the Last Year: Not on file   Transportation Needs:     Lack of Transportation (Medical): Not on file    Lack of Transportation (Non-Medical): Not on file   Physical Activity:     Days of Exercise per Week: Not on file    Minutes of Exercise per Session: Not on file   Stress:     Feeling of Stress : Not on file   Social Connections:     Frequency of Communication with Friends and Family: Not on file    Frequency of Social Gatherings with Friends and Family: Not on file    Attends Yazidism Services: Not on file    Active Member of [address] [place] [address] or Organizations: Not on file    Attends Club or Organization Meetings: Not on file    Marital Status: Not on file   Intimate Partner Violence:     Fear of Current or Ex-Partner: Not on file    Emotionally Abused: Not on file    Physically Abused: Not on file    Sexually Abused: Not on file   Housing Stability:     Unable to Pay for Housing in the Last Year: Not on file    Number of [place] in the Last Year: Not on file    Unstable Housing in the Last Year: Not on file       History reviewed. No pertinent family history. SUBJECTIVE: Lizeth Jean-Baptiste presents to the office today for f/ut. He says he has chronic shoulder pain  He complains of no new or unstable cardiac symptoms,  and denies exertional chest pain, chest pressure/discomfort, claudication, dyspnea, exertional chest pressure/discomfort, fatigue, irregular heart beat, lower extremity edema, near-syncope, orthopnea, palpitations, paroxysmal nocturnal dyspnea, syncope and tachypnea. Works manufacturing and repairing tools, does other chores around the house. Home BPs < 176 sys, 80 diastolic. Back to smoking        Review of Systems:   Heart: as above   Lungs: as above   Eyes: denies changes in vision or discharge. Ears: denies changes in hearing or pain. Nose: denies epistaxis or masses   Throat: denies sore throat or trouble swallowing. Neuro: denies numbness, tingling, tremors.    Skin: denies rashes or itching. : denies hematuria, dysuria   GI: denies vomiting, diarrhea   Psych: denies mood changed, anxiety, depression. all others negative. Physical Exam   BP (!) 155/80   Pulse 83   Resp 18   Ht 5' 7\" (1.702 m)   Wt 169 lb (76.7 kg)   BMI 26.47 kg/m²   Constitutional: Oriented to person, place, and time. No distress. Head: Normocephalic and atraumatic. Eyes: EOM are normal. Pupils are equal, round, and reactive to light. Neck: Normal range of motion. Neck supple. No hepatojugular reflux and no JVD present. LEFT CEA scar  Cardiovascular: Normal rate, regular rhythm, normal heart sounds and intact distal pulses. Exam reveals no gallop and no friction rub. No murmur heard. Pulmonary/Chest: Effort normal and breath sounds normal. No respiratory distress. No wheezes. No rales. Abdominal: Soft. Bowel sounds are normal. No distension and no mass. No tenderness. No rebound and no guarding. Musculoskeletal: Normal range of motion. No edema and no tenderness. Neurological: Alert and oriented to person, place, and time. Skin: Skin is warm and dry. No rash noted. Not diaphoretic. No erythema. Psychiatric: Normal mood and affect.  Behavior is normal.     EKG:  NSR, normal     ASSESSMENT AND PLAN:  Patient Active Problem List   Diagnosis    PVD (peripheral vascular disease) with claudication    Tobacco dependence    Intracranial carotid stenosis, bilateral    Carotid stenosis, left        HLD (hyperlipidemia)     Patient with vascular disease, and likely CAD (given abnormal stress in past)  However, it does not follow that he has angina  Symptoms now resolved  All reviewed with patient including classic angina symptoms etc  Continue high intensity statin, target LDL < 70  Stop smoking    OV prn     Wellington Beyer M.D  [facility]

## 2022-06-29 DIAGNOSIS — I65.23 BILATERAL CAROTID ARTERY STENOSIS: Primary | ICD-10-CM

## 2022-07-13 ENCOUNTER — OFFICE VISIT (OUTPATIENT)
Dept: VASCULAR SURGERY | Age: 62
End: 2022-07-13
Payer: COMMERCIAL

## 2022-07-13 ENCOUNTER — HOSPITAL ENCOUNTER (OUTPATIENT)
Dept: CARDIOLOGY | Age: 62
Discharge: HOME | End: 2022-07-13
Payer: COMMERCIAL

## 2022-07-13 VITALS
SYSTOLIC BLOOD PRESSURE: 146 MMHG | BODY MASS INDEX: 25.9 KG/M2 | HEIGHT: 67 IN | DIASTOLIC BLOOD PRESSURE: 82 MMHG | WEIGHT: 165 LBS

## 2022-07-13 DIAGNOSIS — I65.23 BILATERAL CAROTID ARTERY STENOSIS: Chronic | ICD-10-CM

## 2022-07-13 DIAGNOSIS — I65.23 BILATERAL CAROTID ARTERY STENOSIS: ICD-10-CM

## 2022-07-13 PROBLEM — I77.9 BILATERAL CAROTID ARTERY DISEASE (HCC): Chronic | Status: ACTIVE | Noted: 2022-07-13

## 2022-07-13 PROCEDURE — 93880 EXTRACRANIAL BILAT STUDY: CPT

## 2022-07-13 PROCEDURE — 99213 OFFICE O/P EST LOW 20 MIN: CPT | Performed by: SURGERY

## 2022-07-13 NOTE — PROGRESS NOTES
Vascular Surgery Outpatient Progress Note      Chief Complaint   Patient presents with    Circulatory Problem     carotid stenosis left       HISTORY OF PRESENT ILLNESS:                The patient is a 57 y.o. male who returns for follow up of a left carotid endarterectomy. Overall he is doing well he denies any right-sided left-sided weakness numbness or vision changes. He denies any chest pain or shortness of breath he continues to smoke. Past Medical History:        Diagnosis Date    Bilateral carotid artery stenosis 9/9/2021    Bilateral carotid bruits 9/9/2021    IVAN (cerebral atherosclerosis)     HTN (hypertension)     Hypercholesteremia     Intracranial carotid stenosis, bilateral 10/22/2021    Tobacco dependence 9/9/2021     Past Surgical History:        Procedure Laterality Date    CAROTID ENDARTERECTOMY Left 12/28/2021    LEFT CAROTID ENDARTERECTOMY performed by Nikita Yeager MD at [place] [address]       Current Medications:   Prior to Admission medications    Medication Sig Start Date End Date Taking? Authorizing Provider   metoprolol succinate (TOPROL XL) 100 MG extended release tablet Take 100 mg by mouth daily   Yes Historical Provider, MD   rosuvastatin (CRESTOR) 20 MG tablet Take 1 tablet by mouth daily 1/5/22  Yes Edgar Parada MD   amLODIPine (NORVASC) 10 MG tablet Take 10 mg by mouth daily    Yes Historical Provider, MD   aspirin 81 MG EC tablet Take 81 mg by mouth daily   Yes Historical Provider, MD   cilostazol (PLETAL) 100 MG tablet Take 1 tablet by mouth 2 times daily  Patient taking differently: Take 100 mg by mouth 2 times daily Hold the morning of surgery. 9/9/21 3/10/22  Miranda Orta MD     Allergies:  Patient has no known allergies.     Social History     Socioeconomic History    Marital status:      Spouse name: Not on file    Number of children: Not on file    Years of education: Not on file    Highest education level: Not on file Occupational History    Not on file   Tobacco Use    Smoking status: Current Every Day Smoker    Smokeless tobacco: Never Used   Vaping Use    Vaping Use: Never used   Substance and Sexual Activity    Alcohol use: Not Currently    Drug use: Never    Sexual activity: Not on file   Other Topics Concern    Not on file   Social History Narrative    Not on file     Social Determinants of Health     Financial Resource Strain:     Difficulty of Paying Living Expenses: Not on file   Food Insecurity:     Worried About Running Out of Food in the Last Year: Not on file    Alexa of Food in the Last Year: Not on file   Transportation Needs:     Lack of Transportation (Medical): Not on file    Lack of Transportation (Non-Medical): Not on file   Physical Activity:     Days of Exercise per Week: Not on file    Minutes of Exercise per Session: Not on file   Stress:     Feeling of Stress : Not on file   Social Connections:     Frequency of Communication with Friends and Family: Not on file    Frequency of Social Gatherings with Friends and Family: Not on file    Attends Spiritism Services: Not on file    Active Member of [address] [place] [address] or Organizations: Not on file    Attends Club or Organization Meetings: Not on file    Marital Status: Not on file   Intimate Partner Violence:     Fear of Current or Ex-Partner: Not on file    Emotionally Abused: Not on file    Physically Abused: Not on file    Sexually Abused: Not on file   Housing Stability:     Unable to Pay for Housing in the Last Year: Not on file    Number of [place] in the Last Year: Not on file    Unstable Housing in the Last Year: Not on file        History reviewed. No pertinent family history. REVIEW OF SYSTEMS:    Constitutional: Negative for activity change, appetite change, chills, diaphoresis, fatigue, fever and unexpected weight change.      PHYSICAL EXAM:  Vitals:    07/13/22 1025   BP: (!) 146/82     General Appearance: alert and oriented to person, place and time, well developed and well- nourished, in no acute distress  Skin: warm and dry, no rash or erythema, incision is healing nicely surgical glue still in place  Head: normocephalic and atraumatic  Eyes: extraocular eye movements intact, conjunctivae normal  ENT: external ear and ear canal normal bilaterally, nose without deformity  Pulmonary/Chest: clear to auscultation bilaterally- no wheezes, rales or rhonchi, normal air movement, no respiratory distress  Cardiovascular: normal rate, regular rhythm, normal S1 and S2, no murmurs, no carotid bruits  Neurologic: no cranial nerve deficit, gait, coordination and speech normal  Extremities: Bilateral palpable brachial radial pulses. Problem List Items Addressed This Visit     Bilateral carotid artery disease (HCC) (Chronic)          #1 carotid artery disease status post left carotid endarterectomy. Reviewed his carotid studies. And shared the results with the patient overall he is doing well he remains asymptomatic. He continues to smoke. He was counseled I also went over risk reduction therapy    No follow-ups on file.

## 2022-07-13 NOTE — PROGRESS NOTES
[facility] Heart & Vascular Lab - [facility]    This is a pre read worksheet - prior to official physician interpretation    Abielsabine Suraj  1960  Date of study: 7/13/22    Indication for study:  Carotid artery stenosis  Study : Bilateral Carotid Artery Duplex Examination    Duplex examination of the RIGHT carotid artery system identifies atherosclerotic plaque. The peak systolic velocity in internal carotid artery was 114 centimeters / second. The maximum end diastolic velocity was 35 centimeters / second. The ICA/CCA ratio is 1.5. The right vertebral artery has antegrade flow. Duplex examination of the LEFT carotid artery system identifies atherosclerotic plaque. The peak systolic velocity in internal carotid artery was 157 centimeters / second. The maximum end diastolic velocity was 43 centimeters / second. The ICA/CCA ratio is 1.6. The left vertebral artery has antegrade flow.     RIGHT  psv  LEFT mid ICA slightly tortuous      LAST STUDY  10/6/2021   Lt CEA 12/28/2021

## 2023-01-11 DIAGNOSIS — I65.23 BILATERAL CAROTID ARTERY STENOSIS: Primary | ICD-10-CM

## 2023-01-12 ENCOUNTER — TELEPHONE (OUTPATIENT)
Dept: VASCULAR SURGERY | Age: 63
End: 2023-01-12

## 2023-01-12 NOTE — TELEPHONE ENCOUNTER
Patient was scheduled for carotid ultrasound and office visit on 1-18-23. Yaw denied ultrasound. Left message on patient's voicemail regarding the same, rescheduled ultrasound and visit to 7-19-23 at 10:00 am.  Asked him to call if he is having any problems and he can be seen sooner.

## 2023-06-30 DIAGNOSIS — I65.23 BILATERAL CAROTID ARTERY STENOSIS: Primary | Chronic | ICD-10-CM

## 2023-07-19 ENCOUNTER — HOSPITAL ENCOUNTER (OUTPATIENT)
Dept: CARDIOLOGY | Age: 63
Discharge: HOME | End: 2023-07-19
Payer: COMMERCIAL

## 2023-07-19 ENCOUNTER — OFFICE VISIT (OUTPATIENT)
Dept: VASCULAR SURGERY | Age: 63
End: 2023-07-19
Payer: COMMERCIAL

## 2023-07-19 VITALS — BODY MASS INDEX: 25.9 KG/M2 | WEIGHT: 165 LBS | HEIGHT: 67 IN

## 2023-07-19 DIAGNOSIS — I65.23 BILATERAL CAROTID ARTERY STENOSIS: Primary | ICD-10-CM

## 2023-07-19 DIAGNOSIS — I65.23 BILATERAL CAROTID ARTERY STENOSIS: Chronic | ICD-10-CM

## 2023-07-19 PROCEDURE — 1123F ACP DISCUSS/DSCN MKR DOCD: CPT | Performed by: NURSE PRACTITIONER

## 2023-07-19 PROCEDURE — 99212 OFFICE O/P EST SF 10 MIN: CPT | Performed by: NURSE PRACTITIONER

## 2023-07-19 PROCEDURE — 93880 EXTRACRANIAL BILAT STUDY: CPT

## 2023-07-19 NOTE — PROGRESS NOTES
[facility] Heart & Vascular Lab - [facility]    This is a pre read worksheet - prior to official physician interpretation    Sukumar Anderson  1960  Date of study: 7/19/23    Indication for study:  Carotid artery stenosis  Study : Bilateral Carotid Artery Duplex Examination    Duplex examination of the RIGHT carotid artery system identifies atherosclerotic plaque. The peak systolic velocity in internal carotid artery was 91 centimeters / second. The maximum end diastolic velocity was 26 centimeters / second. The ICA/CCA ratio is 1.1. The right vertebral artery has antegrade flow. Duplex examination of the LEFT carotid artery system identifies atherosclerotic plaque. The peak systolic velocity in internal carotid artery was 111 centimeters / second. The maximum end diastolic velocity was 35 centimeters / second. The ICA/CCA ratio is 1.0. The left vertebral artery has antegrade flow.     RIGHT 30%     psv  LEFT  mild thickening        LAST STUDY  7/13/2022  wnl

## 2023-07-19 NOTE — PROGRESS NOTES
Vascular Surgery Outpatient Progress Note      Chief Complaint   Patient presents with    Circulatory Problem     Bilateral carotid artery stenosis       HISTORY OF PRESENT ILLNESS:                The patient is a 70 y.o. male who returns for follow-up evaluation of carotid artery stenosis s/p L CEA. The patient denies any symptoms of lateralized weakness, slurred speech, or amaurosis fugax. He denies any recent hospital admission, major illness, or surgery since the last office visit. Past Medical History:        Diagnosis Date    Bilateral carotid artery stenosis 9/9/2021    Bilateral carotid bruits 9/9/2021    IVAN (cerebral atherosclerosis)     HTN (hypertension)     Hypercholesteremia     Intracranial carotid stenosis, bilateral 10/22/2021    Tobacco dependence 9/9/2021     Past Surgical History:        Procedure Laterality Date    CAROTID ENDARTERECTOMY Left 12/28/2021    LEFT CAROTID ENDARTERECTOMY performed by Lele Jackson MD at [address] [place] [address]       Current Medications:   Prior to Admission medications    Medication Sig Start Date End Date Taking? Authorizing Provider   metoprolol succinate (TOPROL XL) 100 MG extended release tablet Take 1 tablet by mouth daily   Yes Historical Provider, MD   rosuvastatin (CRESTOR) 20 MG tablet Take 1 tablet by mouth daily 1/5/22  Yes Karen Hilario MD   amLODIPine (NORVASC) 10 MG tablet Take 1 tablet by mouth daily   Yes Historical Provider, MD   aspirin 81 MG EC tablet Take 1 tablet by mouth daily   Yes Historical Provider, MD   cilostazol (PLETAL) 100 MG tablet Take 1 tablet by mouth 2 times daily  Patient taking differently: Take 100 mg by mouth 2 times daily Hold the morning of surgery. 9/9/21 3/10/22  Magnolia Hinton MD     Allergies:  Patient has no known allergies.     Social History     Socioeconomic History    Marital status:      Spouse name: Not on file    Number of children: Not on file    Years of education: Not on file

## 2023-07-19 NOTE — PROGRESS NOTES
[place] Heart & Vascular Lab - [facility]    This is a pre read worksheet - prior to official physician interpretation    Tsering Gambino  1960  Date of study: 7/19/23    Indication for study:  Carotid artery stenosis  Study : Bilateral Carotid Artery Duplex Examination    Duplex examination of the RIGHT carotid artery system identifies atherosclerotic plaque. The peak systolic velocity in internal carotid artery was 1 centimeters / second. The maximum end diastolic velocity was 26 centimeters / second. The ICA/CCA ratio is 1.1. The right vertebral artery has antegrade flow. Duplex examination of the LEFT carotid artery system identifies atherosclerotic plaque. The peak systolic velocity in internal carotid artery was 111 centimeters / second. The maximum end diastolic velocity was 35 centimeters / second. The ICA/CCA ratio is 1.0. The left vertebral artery has antegrade flow.     RIGHT 30%    psv  LEFT mild thickening        LAST STUDY  7/13/2022  wnl

## 2023-07-20 NOTE — PROCEDURES
[address], [address] [place], [place] [address]                                VASCULAR REPORT    PATIENT NAME: Jarocho Raines                 :        1960  MED REC NO:   [id number]                            ROOM:  ACCOUNT NO:   [de-identified]                           ADMIT DATE: 2023  PROVIDER:     Sofy Wilson MD    DATE OF PROCEDURE:  2023    INDICATION:  Carotid bruit, right carotid stenosis. FINDINGS:  Duplex scanning of the right carotid artery revealed the  patient has a moderate plaque with a peak internal carotid velocity of  91, diastolic velocity of 26 cm/sec with approximately 30% stenosis. Duplex scanning of the left carotid artery revealed mild intimal  thickening with a peak internal carotid velocity of 944, diastolic  velocity of 35 cm/sec with a widely patent left carotid artery. IMPRESSION:  30% stenosis of the right carotid associated with widely  patent left carotid artery.         Sofy Wilson MD    D: 2023 6:03:59       T: 2023 6:06:21     [id number]  Job#: [id number]     Doc#: [id number]    CC:

## 2024-07-09 DIAGNOSIS — I65.23 BILATERAL CAROTID ARTERY STENOSIS: Primary | ICD-10-CM

## 2025-08-05 ENCOUNTER — OFFICE VISIT (OUTPATIENT)
Dept: FAMILY MEDICINE CLINIC | Age: 65
End: 2025-08-05
Payer: MEDICARE

## 2025-08-05 VITALS
SYSTOLIC BLOOD PRESSURE: 138 MMHG | WEIGHT: 152 LBS | TEMPERATURE: 97.4 F | RESPIRATION RATE: 15 BRPM | BODY MASS INDEX: 23.86 KG/M2 | HEIGHT: 67 IN | OXYGEN SATURATION: 96 % | HEART RATE: 75 BPM | DIASTOLIC BLOOD PRESSURE: 71 MMHG

## 2025-08-05 DIAGNOSIS — I10 PRIMARY HYPERTENSION: Primary | ICD-10-CM

## 2025-08-05 DIAGNOSIS — Z72.0 TOBACCO USE: ICD-10-CM

## 2025-08-05 DIAGNOSIS — I65.23 BILATERAL CAROTID ARTERY STENOSIS: Chronic | ICD-10-CM

## 2025-08-05 DIAGNOSIS — I73.9 PVD (PERIPHERAL VASCULAR DISEASE) WITH CLAUDICATION: ICD-10-CM

## 2025-08-05 DIAGNOSIS — E78.2 MIXED HYPERLIPIDEMIA: ICD-10-CM

## 2025-08-05 PROCEDURE — 99213 OFFICE O/P EST LOW 20 MIN: CPT | Performed by: STUDENT IN AN ORGANIZED HEALTH CARE EDUCATION/TRAINING PROGRAM

## 2025-08-05 PROCEDURE — 3075F SYST BP GE 130 - 139MM HG: CPT | Performed by: STUDENT IN AN ORGANIZED HEALTH CARE EDUCATION/TRAINING PROGRAM

## 2025-08-05 PROCEDURE — 3078F DIAST BP <80 MM HG: CPT | Performed by: STUDENT IN AN ORGANIZED HEALTH CARE EDUCATION/TRAINING PROGRAM

## 2025-08-05 PROCEDURE — 1159F MED LIST DOCD IN RCRD: CPT | Performed by: STUDENT IN AN ORGANIZED HEALTH CARE EDUCATION/TRAINING PROGRAM

## 2025-08-05 PROCEDURE — 1123F ACP DISCUSS/DSCN MKR DOCD: CPT | Performed by: STUDENT IN AN ORGANIZED HEALTH CARE EDUCATION/TRAINING PROGRAM

## 2025-08-05 RX ORDER — CILOSTAZOL 100 MG/1
100 TABLET ORAL 2 TIMES DAILY
Qty: 360 TABLET | Refills: 0 | Status: SHIPPED | OUTPATIENT
Start: 2025-08-05 | End: 2026-02-01

## 2025-08-05 RX ORDER — VALSARTAN 80 MG/1
80 TABLET ORAL DAILY
Qty: 90 TABLET | Refills: 1 | Status: SHIPPED | OUTPATIENT
Start: 2025-08-05

## 2025-08-05 RX ORDER — METOPROLOL SUCCINATE 50 MG/1
50 TABLET, EXTENDED RELEASE ORAL DAILY
Qty: 90 TABLET | Refills: 0 | Status: SHIPPED | OUTPATIENT
Start: 2025-08-05

## 2025-08-05 SDOH — ECONOMIC STABILITY: FOOD INSECURITY: WITHIN THE PAST 12 MONTHS, THE FOOD YOU BOUGHT JUST DIDN'T LAST AND YOU DIDN'T HAVE MONEY TO GET MORE.: NEVER TRUE

## 2025-08-05 SDOH — ECONOMIC STABILITY: FOOD INSECURITY: WITHIN THE PAST 12 MONTHS, YOU WORRIED THAT YOUR FOOD WOULD RUN OUT BEFORE YOU GOT MONEY TO BUY MORE.: NEVER TRUE

## 2025-08-05 ASSESSMENT — ENCOUNTER SYMPTOMS
BLOOD IN STOOL: 0
COUGH: 0
CONSTIPATION: 0
SHORTNESS OF BREATH: 0
NAUSEA: 0
VOMITING: 0
ABDOMINAL PAIN: 0
DIARRHEA: 0

## 2025-08-05 ASSESSMENT — PATIENT HEALTH QUESTIONNAIRE - PHQ9
SUM OF ALL RESPONSES TO PHQ QUESTIONS 1-9: 0
SUM OF ALL RESPONSES TO PHQ QUESTIONS 1-9: 0
2. FEELING DOWN, DEPRESSED OR HOPELESS: NOT AT ALL
1. LITTLE INTEREST OR PLEASURE IN DOING THINGS: NOT AT ALL
SUM OF ALL RESPONSES TO PHQ QUESTIONS 1-9: 0
SUM OF ALL RESPONSES TO PHQ QUESTIONS 1-9: 0

## 2025-08-27 ENCOUNTER — LAB (OUTPATIENT)
Dept: FAMILY MEDICINE CLINIC | Age: 65
End: 2025-08-27

## 2025-08-27 DIAGNOSIS — Z72.0 TOBACCO USE: ICD-10-CM

## 2025-08-27 DIAGNOSIS — E78.2 MIXED HYPERLIPIDEMIA: ICD-10-CM

## 2025-08-27 DIAGNOSIS — I10 PRIMARY HYPERTENSION: ICD-10-CM

## 2025-08-27 LAB
ALBUMIN: 4 G/DL (ref 3.5–5.2)
ALP BLD-CCNC: 110 U/L (ref 40–129)
ALT SERPL-CCNC: 41 U/L (ref 0–50)
ANION GAP SERPL CALCULATED.3IONS-SCNC: 17 MMOL/L (ref 7–16)
AST SERPL-CCNC: 42 U/L (ref 0–50)
BASOPHILS ABSOLUTE: 0.18 K/UL (ref 0–0.2)
BASOPHILS RELATIVE PERCENT: 1 % (ref 0–2)
BILIRUB SERPL-MCNC: 0.4 MG/DL (ref 0–1.2)
BUN BLDV-MCNC: 9 MG/DL (ref 8–23)
CALCIUM SERPL-MCNC: 9.4 MG/DL (ref 8.8–10.2)
CHLORIDE BLD-SCNC: 105 MMOL/L (ref 98–107)
CHOLESTEROL, TOTAL: 127 MG/DL
CO2: 20 MMOL/L (ref 22–29)
CREAT SERPL-MCNC: 0.9 MG/DL (ref 0.7–1.2)
EOSINOPHILS ABSOLUTE: 0.41 K/UL (ref 0.05–0.5)
EOSINOPHILS RELATIVE PERCENT: 3 % (ref 0–6)
GFR, ESTIMATED: >90 ML/MIN/1.73M2
GLUCOSE BLD-MCNC: 113 MG/DL (ref 74–99)
HCT VFR BLD CALC: 46.7 % (ref 37–54)
HDLC SERPL-MCNC: 30 MG/DL
HEMOGLOBIN: 15.1 G/DL (ref 12.5–16.5)
IMMATURE GRANULOCYTES %: 1 % (ref 0–5)
IMMATURE GRANULOCYTES ABSOLUTE: 0.08 K/UL (ref 0–0.58)
LDL CHOLESTEROL: 54 MG/DL
LYMPHOCYTES ABSOLUTE: 3.12 K/UL (ref 1.5–4)
LYMPHOCYTES RELATIVE PERCENT: 24 % (ref 20–42)
MCH RBC QN AUTO: 29.7 PG (ref 26–35)
MCHC RBC AUTO-ENTMCNC: 32.3 G/DL (ref 32–34.5)
MCV RBC AUTO: 91.9 FL (ref 80–99.9)
MONOCYTES ABSOLUTE: 0.8 K/UL (ref 0.1–0.95)
MONOCYTES RELATIVE PERCENT: 6 % (ref 2–12)
NEUTROPHILS ABSOLUTE: 8.63 K/UL (ref 1.8–7.3)
NEUTROPHILS RELATIVE PERCENT: 65 % (ref 43–80)
PDW BLD-RTO: 13.5 % (ref 11.5–15)
PLATELET # BLD: 386 K/UL (ref 130–450)
PMV BLD AUTO: 10.2 FL (ref 7–12)
POTASSIUM SERPL-SCNC: 3.6 MMOL/L (ref 3.5–5.1)
RBC # BLD: 5.08 M/UL (ref 3.8–5.8)
SODIUM BLD-SCNC: 143 MMOL/L (ref 136–145)
TOTAL PROTEIN: 7.6 G/DL (ref 6.4–8.3)
TRIGL SERPL-MCNC: 213 MG/DL
VLDLC SERPL CALC-MCNC: 43 MG/DL
WBC # BLD: 13.2 K/UL (ref 4.5–11.5)

## 2025-09-02 ENCOUNTER — OFFICE VISIT (OUTPATIENT)
Dept: FAMILY MEDICINE CLINIC | Age: 65
End: 2025-09-02
Payer: MEDICARE

## 2025-09-02 VITALS
BODY MASS INDEX: 24.56 KG/M2 | DIASTOLIC BLOOD PRESSURE: 69 MMHG | HEIGHT: 66 IN | HEART RATE: 61 BPM | RESPIRATION RATE: 16 BRPM | SYSTOLIC BLOOD PRESSURE: 136 MMHG | TEMPERATURE: 97.8 F | WEIGHT: 152.8 LBS | OXYGEN SATURATION: 97 %

## 2025-09-02 DIAGNOSIS — E78.2 MIXED HYPERLIPIDEMIA: ICD-10-CM

## 2025-09-02 DIAGNOSIS — I10 PRIMARY HYPERTENSION: Primary | ICD-10-CM

## 2025-09-02 DIAGNOSIS — D72.829 LEUKOCYTOSIS, UNSPECIFIED TYPE: ICD-10-CM

## 2025-09-02 PROCEDURE — 99213 OFFICE O/P EST LOW 20 MIN: CPT | Performed by: STUDENT IN AN ORGANIZED HEALTH CARE EDUCATION/TRAINING PROGRAM

## 2025-09-02 PROCEDURE — 1159F MED LIST DOCD IN RCRD: CPT | Performed by: STUDENT IN AN ORGANIZED HEALTH CARE EDUCATION/TRAINING PROGRAM

## 2025-09-02 PROCEDURE — 3078F DIAST BP <80 MM HG: CPT | Performed by: STUDENT IN AN ORGANIZED HEALTH CARE EDUCATION/TRAINING PROGRAM

## 2025-09-02 PROCEDURE — 1123F ACP DISCUSS/DSCN MKR DOCD: CPT | Performed by: STUDENT IN AN ORGANIZED HEALTH CARE EDUCATION/TRAINING PROGRAM

## 2025-09-02 PROCEDURE — 3075F SYST BP GE 130 - 139MM HG: CPT | Performed by: STUDENT IN AN ORGANIZED HEALTH CARE EDUCATION/TRAINING PROGRAM

## 2025-09-02 RX ORDER — VALSARTAN 160 MG/1
160 TABLET ORAL DAILY
Qty: 90 TABLET | Refills: 1 | Status: SHIPPED | OUTPATIENT
Start: 2025-09-02

## (undated) DEVICE — SET INSTR ART 1

## (undated) DEVICE — SYRINGE MED 3ML CLR PLAS STD N CTRL LUERLOCK TIP DISP

## (undated) DEVICE — NEEDLE HYPO 21GA L1.5IN GRN POLYPR HUB S STL REG BVL STR

## (undated) DEVICE — NEEDLE HYPO 26GA L0.625IN TAN POLYPR HUB S STL REG BVL STR

## (undated) DEVICE — GARMENT,MEDLINE,DVT,INT,CALF,MED, GEN2: Brand: MEDLINE

## (undated) DEVICE — GLOVE SURG SZ 75 L12IN FNGR THK94MIL TRNSLUC YEL LTX

## (undated) DEVICE — GOWN,SIRUS,FABRNF,L,20/CS: Brand: MEDLINE

## (undated) DEVICE — CATHETER,URETHRAL,REDRUBBER,STERILE,22FR: Brand: MEDLINE

## (undated) DEVICE — TOWEL,OR,DSP,ST,WHITE,DLX,4/PK,20PK/CS: Brand: MEDLINE

## (undated) DEVICE — SYSTEM CATH 20GA L1IN OD1.0668-1.143MM ID0.7874-0.8636MM

## (undated) DEVICE — MINOR VASCULAR: Brand: MEDLINE INDUSTRIES, INC.

## (undated) DEVICE — MAGNETIC INSTR DRAPE 20X16: Brand: MEDLINE INDUSTRIES, INC.

## (undated) DEVICE — SOLUTION IRRIG 1000ML STRL H2O USP PLAS POUR BTL

## (undated) DEVICE — TOWEL,OR,DSP,ST,BLUE,STD,6/PK,12PK/CS: Brand: MEDLINE

## (undated) DEVICE — STAPLER SKIN L39MM DIA0.53MM CRWN 5.7MM S STL FIX HD PROX

## (undated) DEVICE — SOLUTION IRRIG 500ML 0.9% SOD CHL USP POUR PLAS BTL

## (undated) DEVICE — LOOP VES W25MM THK1MM MAXI RED SIL FLD REPELLENT 100 PER

## (undated) DEVICE — NEEDLE HYPO 25GA L0.625IN BLU POLYPR HUB S STL REG BVL STR

## (undated) DEVICE — CLIP LIG M BLU TI HRT SHP WIRE HORZ 180 PER BX

## (undated) DEVICE — 24" (61 CM) ARTERIAL PRESSURE TUBING: Brand: ICU MEDICAL

## (undated) DEVICE — 72" ARTERIAL PRESSURE TUBING: Brand: ICU MEDICAL

## (undated) DEVICE — SET SURG INSTR ART III